# Patient Record
Sex: MALE | Race: BLACK OR AFRICAN AMERICAN | NOT HISPANIC OR LATINO
[De-identification: names, ages, dates, MRNs, and addresses within clinical notes are randomized per-mention and may not be internally consistent; named-entity substitution may affect disease eponyms.]

---

## 2017-07-12 ENCOUNTER — APPOINTMENT (OUTPATIENT)
Dept: UROLOGY | Facility: CLINIC | Age: 67
End: 2017-07-12

## 2017-07-25 ENCOUNTER — APPOINTMENT (OUTPATIENT)
Dept: UROLOGY | Facility: CLINIC | Age: 67
End: 2017-07-25

## 2017-07-29 LAB
ALBUMIN SERPL ELPH-MCNC: 4.7 G/DL
ALP BLD-CCNC: 90 U/L
ALT SERPL-CCNC: 15 U/L
ANION GAP SERPL CALC-SCNC: 15 MMOL/L
APPEARANCE: CLEAR
AST SERPL-CCNC: 20 U/L
BACTERIA UR CULT: NORMAL
BACTERIA: NEGATIVE
BASOPHILS # BLD AUTO: 0.03 K/UL
BASOPHILS NFR BLD AUTO: 0.3 %
BILIRUB SERPL-MCNC: 0.3 MG/DL
BILIRUBIN URINE: NEGATIVE
BLOOD URINE: NEGATIVE
BUN SERPL-MCNC: 14 MG/DL
CALCIUM SERPL-MCNC: 10.3 MG/DL
CHLORIDE SERPL-SCNC: 102 MMOL/L
CO2 SERPL-SCNC: 26 MMOL/L
COLOR: YELLOW
CORE LAB FLUID CYTOLOGY: NORMAL
CREAT SERPL-MCNC: 1.39 MG/DL
EOSINOPHIL # BLD AUTO: 0.22 K/UL
EOSINOPHIL NFR BLD AUTO: 2.1 %
GLUCOSE QUALITATIVE U: NORMAL MG/DL
GLUCOSE SERPL-MCNC: 94 MG/DL
HCT VFR BLD CALC: 44.2 %
HGB BLD-MCNC: 14.7 G/DL
HYALINE CASTS: 2 /LPF
IMM GRANULOCYTES NFR BLD AUTO: 0.3 %
KETONES URINE: NEGATIVE
LEUKOCYTE ESTERASE URINE: NEGATIVE
LYMPHOCYTES # BLD AUTO: 3.11 K/UL
LYMPHOCYTES NFR BLD AUTO: 29.4 %
MAN DIFF?: NORMAL
MCHC RBC-ENTMCNC: 28.2 PG
MCHC RBC-ENTMCNC: 33.3 GM/DL
MCV RBC AUTO: 84.7 FL
MICROSCOPIC-UA: NORMAL
MONOCYTES # BLD AUTO: 1.03 K/UL
MONOCYTES NFR BLD AUTO: 9.7 %
NEUTROPHILS # BLD AUTO: 6.16 K/UL
NEUTROPHILS NFR BLD AUTO: 58.2 %
NITRITE URINE: NEGATIVE
PH URINE: 5.5
PLATELET # BLD AUTO: 340 K/UL
POTASSIUM SERPL-SCNC: 4.9 MMOL/L
PROT SERPL-MCNC: 7.2 G/DL
PROTEIN URINE: 30 MG/DL
PSA SERPL-MCNC: 17.83 NG/ML
RBC # BLD: 5.22 M/UL
RBC # FLD: 16.1 %
RED BLOOD CELLS URINE: 3 /HPF
SODIUM SERPL-SCNC: 143 MMOL/L
SPECIFIC GRAVITY URINE: 1.01
SQUAMOUS EPITHELIAL CELLS: 0 /HPF
TESTOST SERPL-MCNC: 432 NG/DL
UROBILINOGEN URINE: NORMAL MG/DL
WBC # FLD AUTO: 10.58 K/UL
WHITE BLOOD CELLS URINE: 1 /HPF

## 2017-07-31 ENCOUNTER — MESSAGE (OUTPATIENT)
Age: 67
End: 2017-07-31

## 2017-08-08 LAB
BASOPHILS # BLD AUTO: 0.04 K/UL
BASOPHILS NFR BLD AUTO: 0.4 %
EOSINOPHIL # BLD AUTO: 0.21 K/UL
EOSINOPHIL NFR BLD AUTO: 2.1 %
HCT VFR BLD CALC: 44.8 %
HGB BLD-MCNC: 14.9 G/DL
IMM GRANULOCYTES NFR BLD AUTO: 0.4 %
LYMPHOCYTES # BLD AUTO: 3.53 K/UL
LYMPHOCYTES NFR BLD AUTO: 34.7 %
MAN DIFF?: NORMAL
MCHC RBC-ENTMCNC: 28.1 PG
MCHC RBC-ENTMCNC: 33.3 GM/DL
MCV RBC AUTO: 84.4 FL
MONOCYTES # BLD AUTO: 1.03 K/UL
MONOCYTES NFR BLD AUTO: 10.1 %
NEUTROPHILS # BLD AUTO: 5.32 K/UL
NEUTROPHILS NFR BLD AUTO: 52.3 %
PLATELET # BLD AUTO: 362 K/UL
RBC # BLD: 5.31 M/UL
RBC # FLD: 16.3 %
WBC # FLD AUTO: 10.17 K/UL

## 2017-08-09 LAB
ANION GAP SERPL CALC-SCNC: 18 MMOL/L
BUN SERPL-MCNC: 17 MG/DL
CALCIUM SERPL-MCNC: 10.1 MG/DL
CHLORIDE SERPL-SCNC: 103 MMOL/L
CO2 SERPL-SCNC: 23 MMOL/L
CREAT SERPL-MCNC: 1.36 MG/DL
GLUCOSE SERPL-MCNC: 90 MG/DL
POTASSIUM SERPL-SCNC: 5 MMOL/L
SODIUM SERPL-SCNC: 144 MMOL/L

## 2017-10-10 ENCOUNTER — APPOINTMENT (OUTPATIENT)
Dept: UROLOGY | Facility: CLINIC | Age: 67
End: 2017-10-10
Payer: MEDICARE

## 2017-10-10 VITALS
RESPIRATION RATE: 15 BRPM | DIASTOLIC BLOOD PRESSURE: 71 MMHG | WEIGHT: 190 LBS | HEIGHT: 68 IN | SYSTOLIC BLOOD PRESSURE: 134 MMHG | HEART RATE: 73 BPM | BODY MASS INDEX: 28.79 KG/M2

## 2017-10-10 DIAGNOSIS — D72.829 ELEVATED WHITE BLOOD CELL COUNT, UNSPECIFIED: ICD-10-CM

## 2017-10-10 PROCEDURE — 99214 OFFICE O/P EST MOD 30 MIN: CPT

## 2017-10-14 LAB
ALBUMIN SERPL ELPH-MCNC: 4.4 G/DL
ALP BLD-CCNC: 88 U/L
ALT SERPL-CCNC: 22 U/L
ANION GAP SERPL CALC-SCNC: 15 MMOL/L
APPEARANCE: CLEAR
AST SERPL-CCNC: 17 U/L
BACTERIA UR CULT: NORMAL
BACTERIA: NEGATIVE
BASOPHILS # BLD AUTO: 0.03 K/UL
BASOPHILS NFR BLD AUTO: 0.3 %
BILIRUB SERPL-MCNC: 0.3 MG/DL
BILIRUBIN URINE: NEGATIVE
BLOOD URINE: NEGATIVE
BUN SERPL-MCNC: 18 MG/DL
CALCIUM SERPL-MCNC: 10.1 MG/DL
CHLORIDE SERPL-SCNC: 106 MMOL/L
CO2 SERPL-SCNC: 25 MMOL/L
COLOR: YELLOW
CREAT SERPL-MCNC: 1.41 MG/DL
EOSINOPHIL # BLD AUTO: 0.19 K/UL
EOSINOPHIL NFR BLD AUTO: 2 %
GLUCOSE QUALITATIVE U: NEGATIVE MG/DL
GLUCOSE SERPL-MCNC: 108 MG/DL
HCT VFR BLD CALC: 43.6 %
HGB BLD-MCNC: 14.6 G/DL
HYALINE CASTS: 4 /LPF
IMM GRANULOCYTES NFR BLD AUTO: 0.3 %
KETONES URINE: NEGATIVE
LEUKOCYTE ESTERASE URINE: NEGATIVE
LYMPHOCYTES # BLD AUTO: 2.81 K/UL
LYMPHOCYTES NFR BLD AUTO: 30.2 %
MAN DIFF?: NORMAL
MCHC RBC-ENTMCNC: 28.7 PG
MCHC RBC-ENTMCNC: 33.5 GM/DL
MCV RBC AUTO: 85.7 FL
MICROSCOPIC-UA: NORMAL
MONOCYTES # BLD AUTO: 0.85 K/UL
MONOCYTES NFR BLD AUTO: 9.1 %
NEUTROPHILS # BLD AUTO: 5.4 K/UL
NEUTROPHILS NFR BLD AUTO: 58.1 %
NITRITE URINE: NEGATIVE
PH URINE: 5.5
PLATELET # BLD AUTO: 349 K/UL
POTASSIUM SERPL-SCNC: 5 MMOL/L
PROT SERPL-MCNC: 7.3 G/DL
PROTEIN URINE: 30 MG/DL
PSA SERPL-MCNC: 17.1 NG/ML
RBC # BLD: 5.09 M/UL
RBC # FLD: 15.9 %
RED BLOOD CELLS URINE: 4 /HPF
SODIUM SERPL-SCNC: 146 MMOL/L
SPECIFIC GRAVITY URINE: 1.02
SQUAMOUS EPITHELIAL CELLS: 1 /HPF
TESTOST SERPL-MCNC: 470.8 NG/DL
UROBILINOGEN URINE: NEGATIVE MG/DL
WBC # FLD AUTO: 9.31 K/UL
WHITE BLOOD CELLS URINE: 3 /HPF

## 2017-10-16 LAB — CORE LAB FLUID CYTOLOGY: NORMAL

## 2017-11-14 ENCOUNTER — APPOINTMENT (OUTPATIENT)
Dept: UROLOGY | Facility: CLINIC | Age: 67
End: 2017-11-14
Payer: MEDICARE

## 2017-11-14 PROCEDURE — 99213 OFFICE O/P EST LOW 20 MIN: CPT

## 2018-04-17 ENCOUNTER — APPOINTMENT (OUTPATIENT)
Dept: UROLOGY | Facility: CLINIC | Age: 68
End: 2018-04-17
Payer: MEDICARE

## 2018-04-17 PROCEDURE — 99214 OFFICE O/P EST MOD 30 MIN: CPT

## 2018-04-22 LAB
ALBUMIN SERPL ELPH-MCNC: 4.4 G/DL
ALP BLD-CCNC: 82 U/L
ALT SERPL-CCNC: 13 U/L
ANION GAP SERPL CALC-SCNC: 15 MMOL/L
APPEARANCE: CLEAR
AST SERPL-CCNC: 19 U/L
BACTERIA UR CULT: NORMAL
BACTERIA: NEGATIVE
BASOPHILS # BLD AUTO: 0.03 K/UL
BASOPHILS NFR BLD AUTO: 0.4 %
BILIRUB SERPL-MCNC: 0.5 MG/DL
BILIRUBIN URINE: NEGATIVE
BLOOD URINE: NEGATIVE
BUN SERPL-MCNC: 14 MG/DL
CALCIUM SERPL-MCNC: 10.6 MG/DL
CHLORIDE SERPL-SCNC: 105 MMOL/L
CO2 SERPL-SCNC: 25 MMOL/L
COLOR: YELLOW
CORE LAB FLUID CYTOLOGY: NORMAL
CREAT SERPL-MCNC: 1.33 MG/DL
EOSINOPHIL # BLD AUTO: 0.12 K/UL
EOSINOPHIL NFR BLD AUTO: 1.4 %
GLUCOSE QUALITATIVE U: NEGATIVE MG/DL
GLUCOSE SERPL-MCNC: 92 MG/DL
HCT VFR BLD CALC: 44.5 %
HGB BLD-MCNC: 15.3 G/DL
HYALINE CASTS: 1 /LPF
IMM GRANULOCYTES NFR BLD AUTO: 0.1 %
KETONES URINE: NEGATIVE
LEUKOCYTE ESTERASE URINE: NEGATIVE
LYMPHOCYTES # BLD AUTO: 2.3 K/UL
LYMPHOCYTES NFR BLD AUTO: 26.8 %
MAN DIFF?: NORMAL
MCHC RBC-ENTMCNC: 29.5 PG
MCHC RBC-ENTMCNC: 34.4 GM/DL
MCV RBC AUTO: 85.9 FL
MICROSCOPIC-UA: NORMAL
MONOCYTES # BLD AUTO: 0.94 K/UL
MONOCYTES NFR BLD AUTO: 11 %
NEUTROPHILS # BLD AUTO: 5.17 K/UL
NEUTROPHILS NFR BLD AUTO: 60.3 %
NITRITE URINE: NEGATIVE
PH URINE: 5.5
PLATELET # BLD AUTO: 360 K/UL
POTASSIUM SERPL-SCNC: 5 MMOL/L
PROT SERPL-MCNC: 7.4 G/DL
PROTEIN URINE: 30 MG/DL
PSA SERPL-MCNC: 18.79 NG/ML
RBC # BLD: 5.18 M/UL
RBC # FLD: 15.8 %
RED BLOOD CELLS URINE: 1 /HPF
SODIUM SERPL-SCNC: 145 MMOL/L
SPECIFIC GRAVITY URINE: 1.01
SQUAMOUS EPITHELIAL CELLS: 0 /HPF
TESTOST SERPL-MCNC: 452.9 NG/DL
UROBILINOGEN URINE: NEGATIVE MG/DL
WBC # FLD AUTO: 8.57 K/UL
WHITE BLOOD CELLS URINE: 0 /HPF

## 2018-09-17 ENCOUNTER — APPOINTMENT (OUTPATIENT)
Dept: UROLOGY | Facility: CLINIC | Age: 68
End: 2018-09-17
Payer: MEDICARE

## 2018-09-17 ENCOUNTER — LABORATORY RESULT (OUTPATIENT)
Age: 68
End: 2018-09-17

## 2018-09-17 VITALS
HEART RATE: 69 BPM | RESPIRATION RATE: 16 BRPM | TEMPERATURE: 98.5 F | DIASTOLIC BLOOD PRESSURE: 75 MMHG | SYSTOLIC BLOOD PRESSURE: 165 MMHG

## 2018-09-17 LAB
ALBUMIN SERPL ELPH-MCNC: 4.7 G/DL
ALP BLD-CCNC: 90 U/L
ALT SERPL-CCNC: 17 U/L
ANION GAP SERPL CALC-SCNC: 13 MMOL/L
APPEARANCE: CLEAR
AST SERPL-CCNC: 17 U/L
BACTERIA: NEGATIVE
BASOPHILS # BLD AUTO: 0.04 K/UL
BASOPHILS NFR BLD AUTO: 0.4 %
BILIRUB SERPL-MCNC: 0.2 MG/DL
BILIRUBIN URINE: NEGATIVE
BLOOD URINE: NEGATIVE
BUN SERPL-MCNC: 12 MG/DL
CALCIUM SERPL-MCNC: 10.4 MG/DL
CHLORIDE SERPL-SCNC: 102 MMOL/L
CO2 SERPL-SCNC: 27 MMOL/L
COLOR: YELLOW
CREAT SERPL-MCNC: 1.28 MG/DL
EOSINOPHIL # BLD AUTO: 0.14 K/UL
EOSINOPHIL NFR BLD AUTO: 1.5 %
GLUCOSE QUALITATIVE U: NEGATIVE MG/DL
GLUCOSE SERPL-MCNC: 102 MG/DL
HCT VFR BLD CALC: 44.3 %
HGB BLD-MCNC: 14.7 G/DL
HYALINE CASTS: 1 /LPF
IMM GRANULOCYTES NFR BLD AUTO: 0.4 %
KETONES URINE: NEGATIVE
LEUKOCYTE ESTERASE URINE: NEGATIVE
LYMPHOCYTES # BLD AUTO: 2.15 K/UL
LYMPHOCYTES NFR BLD AUTO: 22.3 %
MAN DIFF?: NORMAL
MCHC RBC-ENTMCNC: 29 PG
MCHC RBC-ENTMCNC: 33.2 GM/DL
MCV RBC AUTO: 87.4 FL
MICROSCOPIC-UA: NORMAL
MONOCYTES # BLD AUTO: 1.58 K/UL
MONOCYTES NFR BLD AUTO: 16.4 %
NEUTROPHILS # BLD AUTO: 5.67 K/UL
NEUTROPHILS NFR BLD AUTO: 59 %
NITRITE URINE: NEGATIVE
PH URINE: 6
PLATELET # BLD AUTO: 382 K/UL
POTASSIUM SERPL-SCNC: 5.1 MMOL/L
PROT SERPL-MCNC: 6.9 G/DL
PROTEIN URINE: 30 MG/DL
PSA SERPL-MCNC: 17.55 NG/ML
RBC # BLD: 5.07 M/UL
RBC # FLD: 15.8 %
RED BLOOD CELLS URINE: 2 /HPF
SODIUM SERPL-SCNC: 142 MMOL/L
SPECIFIC GRAVITY URINE: 1.01
SQUAMOUS EPITHELIAL CELLS: 0 /HPF
TESTOST SERPL-MCNC: 388 NG/DL
UROBILINOGEN URINE: NEGATIVE MG/DL
WBC # FLD AUTO: 9.62 K/UL
WHITE BLOOD CELLS URINE: 1 /HPF

## 2018-09-17 PROCEDURE — 99214 OFFICE O/P EST MOD 30 MIN: CPT

## 2018-09-20 LAB
BACTERIA UR CULT: NORMAL
CORE LAB FLUID CYTOLOGY: NORMAL

## 2018-09-30 ENCOUNTER — FORM ENCOUNTER (OUTPATIENT)
Age: 68
End: 2018-09-30

## 2018-10-01 ENCOUNTER — OUTPATIENT (OUTPATIENT)
Dept: OUTPATIENT SERVICES | Facility: HOSPITAL | Age: 68
LOS: 1 days | End: 2018-10-01
Payer: COMMERCIAL

## 2018-10-01 ENCOUNTER — APPOINTMENT (OUTPATIENT)
Dept: NUCLEAR MEDICINE | Facility: IMAGING CENTER | Age: 68
End: 2018-10-01
Payer: MEDICARE

## 2018-10-01 ENCOUNTER — APPOINTMENT (OUTPATIENT)
Dept: CT IMAGING | Facility: IMAGING CENTER | Age: 68
End: 2018-10-01
Payer: MEDICARE

## 2018-10-01 DIAGNOSIS — R31.29 OTHER MICROSCOPIC HEMATURIA: ICD-10-CM

## 2018-10-01 DIAGNOSIS — C61 MALIGNANT NEOPLASM OF PROSTATE: ICD-10-CM

## 2018-10-01 PROCEDURE — 78306 BONE IMAGING WHOLE BODY: CPT

## 2018-10-01 PROCEDURE — 78306 BONE IMAGING WHOLE BODY: CPT | Mod: 26

## 2018-10-01 PROCEDURE — 71260 CT THORAX DX C+: CPT | Mod: 26

## 2018-10-01 PROCEDURE — 74178 CT ABD&PLV WO CNTR FLWD CNTR: CPT

## 2018-10-01 PROCEDURE — A9561: CPT

## 2018-10-01 PROCEDURE — 71260 CT THORAX DX C+: CPT

## 2018-10-01 PROCEDURE — 74178 CT ABD&PLV WO CNTR FLWD CNTR: CPT | Mod: 26

## 2018-10-23 ENCOUNTER — APPOINTMENT (OUTPATIENT)
Dept: THORACIC SURGERY | Facility: CLINIC | Age: 68
End: 2018-10-23
Payer: MEDICARE

## 2018-10-23 VITALS
HEIGHT: 68 IN | WEIGHT: 199 LBS | OXYGEN SATURATION: 100 % | TEMPERATURE: 98.3 F | HEART RATE: 72 BPM | RESPIRATION RATE: 18 BRPM | SYSTOLIC BLOOD PRESSURE: 141 MMHG | DIASTOLIC BLOOD PRESSURE: 71 MMHG | BODY MASS INDEX: 30.16 KG/M2

## 2018-10-23 PROCEDURE — 99205 OFFICE O/P NEW HI 60 MIN: CPT

## 2019-01-16 ENCOUNTER — APPOINTMENT (OUTPATIENT)
Dept: INTERVENTIONAL RADIOLOGY/VASCULAR | Facility: CLINIC | Age: 69
End: 2019-01-16
Payer: MEDICARE

## 2019-01-16 VITALS
HEART RATE: 69 BPM | BODY MASS INDEX: 30.31 KG/M2 | OXYGEN SATURATION: 97 % | HEIGHT: 68 IN | SYSTOLIC BLOOD PRESSURE: 128 MMHG | RESPIRATION RATE: 16 BRPM | WEIGHT: 200 LBS | DIASTOLIC BLOOD PRESSURE: 75 MMHG

## 2019-01-16 DIAGNOSIS — Z86.79 PERSONAL HISTORY OF OTHER DISEASES OF THE CIRCULATORY SYSTEM: ICD-10-CM

## 2019-01-16 DIAGNOSIS — R91.1 SOLITARY PULMONARY NODULE: ICD-10-CM

## 2019-01-16 DIAGNOSIS — Z78.9 OTHER SPECIFIED HEALTH STATUS: ICD-10-CM

## 2019-01-16 PROCEDURE — 99204 OFFICE O/P NEW MOD 45 MIN: CPT

## 2019-01-16 NOTE — HISTORY OF PRESENT ILLNESS
[FreeTextEntry1] : 68 years old male with hx of prostate cancer s/p surgery and seed implant in 2006. Patient has been following up with Dr. Epperson and had a surveillance CT C/A/P and bone scan. \par Ct scan demonstrated, “ No hydronephrosis, renal or collecting system calculi. No filling defects in the collecting systems or urinary bladder on urographic phase images. 2 mm right middle lobe pulmonary nodule. Right axillary lymph noted is slightly increased in size compared with the prior study”. \par Patient was referred to Dr. Ag for the pulmonary nodules. \par Dr. Ag referred patient to IR for axillary lymph node biopsy and to have six  month f/u chest CT for pulmonary nodules. \par \par Patients states he has been doing well overall. Appetite has been good no unintentional weight loss. \par \par Denies any recent SOB, CP, fever, chills, n/v/d, gross hematuria. \par

## 2019-01-16 NOTE — ASSESSMENT
[FreeTextEntry1] : 68 year old male with h/o prostate cancer found to have slowly increasing in size right axillary lymph node, 2.6 x1.1 cm on CT form 10/01/18, 1.8 x 0.6 cm on  CT is from 12/2015. Patient is an appropriate candidate for US guided right axillary l/n needle biopsy under local anesthesia. The procedure, the risks of bleeding, infection, tumor seeding were discussed with the patient and informed consent obtained.

## 2019-01-16 NOTE — PHYSICAL EXAM
[Alert] : alert [Normal Sclera/Conjunctiva] : normal sclera/conjunctiva [Normal Hearing] : hearing was normal [No Respiratory Distress] : no respiratory distress [Normal Rate] : heart rate was normal  [Normal Gait] : normal gait [No Tremors] : no tremors [Oriented x3] : oriented to person, place, and time [No Accessory Muscle Use] : no accessory muscle use [Clear to Auscultation] : lungs were clear to auscultation bilaterally

## 2019-01-16 NOTE — REVIEW OF SYSTEMS
[Fever] : no fever [Chills] : no chills [Loss Of Hearing] : no hearing loss [Shortness Of Breath] : no shortness of breath [SOB on Exertion] : no shortness of breath during exertion [Easy Bleeding] : no tendency for easy bleeding [Easy Bruising] : no tendency for easy bruising

## 2019-01-16 NOTE — CONSULT LETTER
[Dear  ___] : Dear  [unfilled], [Consult Letter:] : I had the pleasure of evaluating your patient, [unfilled]. [Please see my note below.] : Please see my note below. [Consult Closing:] : Thank you very much for allowing me to participate in the care of this patient.  If you have any questions, please do not hesitate to contact me. [Sincerely,] : Sincerely, [FreeTextEntry2] : Dr. Jose Ag

## 2019-01-28 ENCOUNTER — RESULT REVIEW (OUTPATIENT)
Age: 69
End: 2019-01-28

## 2019-01-28 ENCOUNTER — OUTPATIENT (OUTPATIENT)
Dept: OUTPATIENT SERVICES | Facility: HOSPITAL | Age: 69
LOS: 1 days | End: 2019-01-28
Payer: COMMERCIAL

## 2019-01-28 DIAGNOSIS — C61 MALIGNANT NEOPLASM OF PROSTATE: ICD-10-CM

## 2019-01-28 DIAGNOSIS — R59.1 GENERALIZED ENLARGED LYMPH NODES: ICD-10-CM

## 2019-01-28 PROCEDURE — 88173 CYTOPATH EVAL FNA REPORT: CPT | Mod: 26

## 2019-01-28 PROCEDURE — 38505 NEEDLE BIOPSY LYMPH NODES: CPT

## 2019-01-28 PROCEDURE — 76942 ECHO GUIDE FOR BIOPSY: CPT | Mod: 26

## 2019-01-29 LAB
NON-GYNECOLOGICAL CYTOLOGY STUDY: SIGNIFICANT CHANGE UP
TM INTERPRETATION: SIGNIFICANT CHANGE UP

## 2019-01-30 DIAGNOSIS — R59.0 LOCALIZED ENLARGED LYMPH NODES: ICD-10-CM

## 2019-01-30 DIAGNOSIS — Z85.46 PERSONAL HISTORY OF MALIGNANT NEOPLASM OF PROSTATE: ICD-10-CM

## 2019-04-04 ENCOUNTER — OTHER (OUTPATIENT)
Age: 69
End: 2019-04-04

## 2019-04-09 ENCOUNTER — OTHER (OUTPATIENT)
Age: 69
End: 2019-04-09

## 2019-04-09 ENCOUNTER — CHART COPY (OUTPATIENT)
Age: 69
End: 2019-04-09

## 2019-04-22 ENCOUNTER — OUTPATIENT (OUTPATIENT)
Dept: OUTPATIENT SERVICES | Facility: HOSPITAL | Age: 69
LOS: 1 days | End: 2019-04-22
Payer: COMMERCIAL

## 2019-04-22 VITALS
HEART RATE: 68 BPM | HEIGHT: 66 IN | SYSTOLIC BLOOD PRESSURE: 130 MMHG | RESPIRATION RATE: 14 BRPM | TEMPERATURE: 99 F | WEIGHT: 199.96 LBS | OXYGEN SATURATION: 98 % | DIASTOLIC BLOOD PRESSURE: 70 MMHG

## 2019-04-22 DIAGNOSIS — L72.0 EPIDERMAL CYST: ICD-10-CM

## 2019-04-22 DIAGNOSIS — Z98.49 CATARACT EXTRACTION STATUS, UNSPECIFIED EYE: Chronic | ICD-10-CM

## 2019-04-22 DIAGNOSIS — Z98.890 OTHER SPECIFIED POSTPROCEDURAL STATES: Chronic | ICD-10-CM

## 2019-04-22 DIAGNOSIS — R06.83 SNORING: ICD-10-CM

## 2019-04-22 DIAGNOSIS — I10 ESSENTIAL (PRIMARY) HYPERTENSION: ICD-10-CM

## 2019-04-22 LAB
ALBUMIN SERPL ELPH-MCNC: 4.5 G/DL — SIGNIFICANT CHANGE UP (ref 3.3–5)
ALP SERPL-CCNC: 74 U/L — SIGNIFICANT CHANGE UP (ref 40–120)
ALT FLD-CCNC: 11 U/L — SIGNIFICANT CHANGE UP (ref 4–41)
ANION GAP SERPL CALC-SCNC: 11 MMO/L — SIGNIFICANT CHANGE UP (ref 7–14)
AST SERPL-CCNC: 17 U/L — SIGNIFICANT CHANGE UP (ref 4–40)
BILIRUB SERPL-MCNC: 0.3 MG/DL — SIGNIFICANT CHANGE UP (ref 0.2–1.2)
BUN SERPL-MCNC: 18 MG/DL — SIGNIFICANT CHANGE UP (ref 7–23)
CALCIUM SERPL-MCNC: 10.4 MG/DL — SIGNIFICANT CHANGE UP (ref 8.4–10.5)
CHLORIDE SERPL-SCNC: 103 MMOL/L — SIGNIFICANT CHANGE UP (ref 98–107)
CO2 SERPL-SCNC: 28 MMOL/L — SIGNIFICANT CHANGE UP (ref 22–31)
CREAT SERPL-MCNC: 1.29 MG/DL — SIGNIFICANT CHANGE UP (ref 0.5–1.3)
GLUCOSE SERPL-MCNC: 83 MG/DL — SIGNIFICANT CHANGE UP (ref 70–99)
HCT VFR BLD CALC: 45.5 % — SIGNIFICANT CHANGE UP (ref 39–50)
HGB BLD-MCNC: 14.8 G/DL — SIGNIFICANT CHANGE UP (ref 13–17)
MCHC RBC-ENTMCNC: 28.6 PG — SIGNIFICANT CHANGE UP (ref 27–34)
MCHC RBC-ENTMCNC: 32.5 % — SIGNIFICANT CHANGE UP (ref 32–36)
MCV RBC AUTO: 87.8 FL — SIGNIFICANT CHANGE UP (ref 80–100)
NRBC # FLD: 0 K/UL — SIGNIFICANT CHANGE UP (ref 0–0)
PLATELET # BLD AUTO: 352 K/UL — SIGNIFICANT CHANGE UP (ref 150–400)
PMV BLD: 10.1 FL — SIGNIFICANT CHANGE UP (ref 7–13)
POTASSIUM SERPL-MCNC: 5 MMOL/L — SIGNIFICANT CHANGE UP (ref 3.5–5.3)
POTASSIUM SERPL-SCNC: 5 MMOL/L — SIGNIFICANT CHANGE UP (ref 3.5–5.3)
PROT SERPL-MCNC: 7.3 G/DL — SIGNIFICANT CHANGE UP (ref 6–8.3)
RBC # BLD: 5.18 M/UL — SIGNIFICANT CHANGE UP (ref 4.2–5.8)
RBC # FLD: 14.9 % — HIGH (ref 10.3–14.5)
SODIUM SERPL-SCNC: 142 MMOL/L — SIGNIFICANT CHANGE UP (ref 135–145)
WBC # BLD: 8.61 K/UL — SIGNIFICANT CHANGE UP (ref 3.8–10.5)
WBC # FLD AUTO: 8.61 K/UL — SIGNIFICANT CHANGE UP (ref 3.8–10.5)

## 2019-04-22 PROCEDURE — 93010 ELECTROCARDIOGRAM REPORT: CPT

## 2019-04-22 NOTE — H&P PST ADULT - ASSESSMENT
Patient is scheduled for excision left ear cyst scheduled on 4/30/2019 with Dr. Quiroz. Pre op diagnosis: Epidermal cyst

## 2019-04-22 NOTE — H&P PST ADULT - NSANTHOSAYNRD_GEN_A_CORE
No. PEPE screening performed.  STOP BANG Legend: 0-2 = LOW Risk; 3-4 = INTERMEDIATE Risk; 5-8 = HIGH Risk

## 2019-04-22 NOTE — H&P PST ADULT - HISTORY OF PRESENT ILLNESS
Patient is a 68 year old male presents to Presurgical testing for an evaluation for scheduled excision left ear cyst scheduled on 4/30/2019 with Dr. Quiroz. Pre op diagnosis: Epidermal cyst. Patient reports having the cyst for the past year and half. Patient denies any pain, drainage to the area.

## 2019-04-22 NOTE — H&P PST ADULT - NEGATIVE ENMT SYMPTOMS
no ear pain/no throat pain/no post-nasal discharge/no nose bleeds/no recurrent cold sores/no dry mouth/no dysphagia/no tinnitus/no sinus symptoms/no nasal discharge/no gum bleeding/no nasal congestion/no nasal obstruction/no abnormal taste sensation/no vertigo

## 2019-04-22 NOTE — H&P PST ADULT - NSICDXPASTSURGICALHX_GEN_ALL_CORE_FT
PAST SURGICAL HISTORY:  History of surgery Cryosurgical ablation of prostate with flexible cystoscopy on 02/03/2012    Prostate seed implant 2006     S/P cataract surgery Right eye

## 2019-04-22 NOTE — H&P PST ADULT - NEGATIVE MUSCULOSKELETAL SYMPTOMS
no myalgia/no arm pain L/no back pain/no joint swelling/no leg pain L/no stiffness/no neck pain/no arm pain R/no leg pain R/no arthritis/no muscle cramps/no muscle weakness

## 2019-04-22 NOTE — H&P PST ADULT - NEGATIVE NEUROLOGICAL SYMPTOMS
no weakness/no tremors/no paresthesias/no generalized seizures/no loss of consciousness/no focal seizures/no vertigo/no loss of sensation/no difficulty walking/no headache/no confusion

## 2019-04-22 NOTE — H&P PST ADULT - NEGATIVE GASTROINTESTINAL SYMPTOMS
no nausea/no vomiting/no diarrhea/no melena/no constipation/no abdominal pain/no change in bowel habits

## 2019-04-22 NOTE — H&P PST ADULT - MUSCULOSKELETAL
details… detailed exam no joint warmth/no calf tenderness/no joint swelling/no joint erythema/ROM intact/normal strength

## 2019-04-22 NOTE — H&P PST ADULT - NEGATIVE OPHTHALMOLOGIC SYMPTOMS
no irritation L/no blurred vision L/no discharge L/no photophobia/no blurred vision R/no discharge R/no pain L/no irritation R/no diplopia/no pain R

## 2019-04-22 NOTE — H&P PST ADULT - NSICDXPROBLEM_GEN_ALL_CORE_FT
PROBLEM DIAGNOSES  Problem: Epidermal cyst  Assessment and Plan: Patient is scheduled for excision left ear cyst scheduled on 4/30/2019 with Dr. Quiroz.  Preop instructions, pepcid provided. Pt stated understanding.  Pending medical evaluation per surgeon and PST- Patient with a history of Stroke in 2011, Prostate cancer - patient reports has an appointment on 4/22/2019.     Problem: Snoring  Assessment and Plan: PEPE precautions, OR booking notified.      Problem: Hypertension  Assessment and Plan: Patient instructed to take Amlodipine in the AM of surgery with a sip of water.

## 2019-04-22 NOTE — H&P PST ADULT - NSICDXPASTMEDICALHX_GEN_ALL_CORE_FT
PAST MEDICAL HISTORY:  Cataract     Epidermal cyst     Erectile dysfunction     Fall against object 2009     HLD (hyperlipidemia)     HTN (hypertension)     Malignant neoplasm of prostate     Stroke Nov 2011 Denies residual , No neurologist

## 2019-04-22 NOTE — H&P PST ADULT - SKIN COMMENTS
Pre op diagnosis: Epidermal cyst, --left ear cyst noted, non tender to touch , 1cm x 1cm approximately

## 2019-04-22 NOTE — H&P PST ADULT - NEGATIVE GENERAL GENITOURINARY SYMPTOMS
normal urinary frequency/no flank pain R/no urine discoloration/no incontinence/no bladder infections/no flank pain L/no hematuria/no urinary hesitancy/no dysuria

## 2019-04-22 NOTE — H&P PST ADULT - RS GEN PE MLT RESP DETAILS PC
respirations non-labored/no rales/clear to auscultation bilaterally/airway patent/breath sounds equal/no wheezes/good air movement/no rhonchi

## 2019-04-22 NOTE — H&P PST ADULT - NEGATIVE ALLERGY TYPES
no reactions to medicines/no outdoor environmental allergies/no reactions to animals/no reactions to insect bites/no reactions to food/no indoor environmental allergies

## 2019-04-23 ENCOUNTER — APPOINTMENT (OUTPATIENT)
Dept: THORACIC SURGERY | Facility: CLINIC | Age: 69
End: 2019-04-23
Payer: MEDICARE

## 2019-04-23 VITALS
HEIGHT: 60 IN | TEMPERATURE: 98.6 F | HEART RATE: 70 BPM | BODY MASS INDEX: 39.27 KG/M2 | OXYGEN SATURATION: 100 % | RESPIRATION RATE: 16 BRPM | DIASTOLIC BLOOD PRESSURE: 72 MMHG | SYSTOLIC BLOOD PRESSURE: 127 MMHG | WEIGHT: 200 LBS

## 2019-04-23 DIAGNOSIS — R59.0 LOCALIZED ENLARGED LYMPH NODES: ICD-10-CM

## 2019-04-23 PROCEDURE — 99214 OFFICE O/P EST MOD 30 MIN: CPT

## 2019-04-25 ENCOUNTER — APPOINTMENT (OUTPATIENT)
Dept: UROLOGY | Facility: CLINIC | Age: 69
End: 2019-04-25
Payer: MEDICARE

## 2019-04-25 PROCEDURE — 99213 OFFICE O/P EST LOW 20 MIN: CPT

## 2019-04-25 NOTE — DISCUSSION/SUMMARY
[FreeTextEntry1] : the patient has returned for followup. He has been injecting 0.4 cc to trimix. He is also noticed that the needle side bending with injection. We discussed using a needle of 29-gauge instead of 30-gauge which we have requested. We also renewed his prescription. With one refill. I'll see him back in 3 months in followup.

## 2019-04-25 NOTE — ASSESSMENT
[FreeTextEntry1] : the patient has returned for followup. He has been injecting 0.4 cc of the trimix. He is using the 29 ga needle with better results. He needs a refill today.I reviewed with him the procedure of injection therapy. He is also thinking about the penile implant but not quite ready yet.

## 2019-04-25 NOTE — HISTORY OF PRESENT ILLNESS
[FreeTextEntry1] : the patient has returned for followup. He has been injecting 0.4 cc of the trimix. He is using the 29 ga needle with better results. He needs a refill. Not ready for IPP yet. \par \par PMH: Patient initially presented  with a chief complaint of erectile dysfunction.  Patient had cryosurgery of the prostate in February of this year. Prior to surgery had decrease in his erections are responding to Viagra at 100 mg and use. However, after surgery he said a decrease in his ability to obtain or maintain erections even with these medications. His erections are not modified with the degree of sexual stimulation.   He states that his erections presently are often less than 2 out of 10 in both tumescence and rigidity, insufficient for penetration.   He often ejaculates through a flaccid phallus.  He has difficulty maintaining an erection. He describes a normal libido.  His sexual dysfunction occurs with both sexual relations and masturbation.  His erections are not improved with PDE5 inhibitors.   His partner is understanding and was unable to be with him at the visit today. He is   and has adult children.  \par \par His past medical history is right testicular discomfort and a nodule on his right testis he is noted for several months.  In his present occupation he has no known toxin exposure. He does not smoke and drinks socially .  He has no known drug allergies. His review of systems and past family and social history is otherwise noncontributory (see patient completed questionnaire). His family history is not significant. He has an AUA symptom score of 21. He has a sexual function symptom score of 20 (normal is 60-75).  \par patient has been injecting 0.35 cc of Trimix, he feels that the erection are strong  and he is satisfied.  He was interested in have retraining on how to inject, as he sometimes gets inadequate response to the injections.  He states that he often does not wear his glasses while injecting.  Patients PSA also increased this month to >9 from 6.5 and trace blood on urinalysis.  Dr Epperson is following and has ordered a bone scan and CT urogram.

## 2019-04-25 NOTE — PHYSICAL EXAM
[General Appearance - Well Developed] : well developed [Normal Appearance] : normal appearance [General Appearance - Well Nourished] : well nourished [General Appearance - In No Acute Distress] : no acute distress [Well Groomed] : well groomed [Bowel Sounds] : normal bowel sounds [Abdomen Soft] : soft [Costovertebral Angle Tenderness] : no ~M costovertebral angle tenderness [Abdomen Tenderness] : non-tender [Urethral Meatus] : meatus normal [Urinary Bladder Findings] : the bladder was normal on palpation [Scrotum] : the scrotum was normal [Testes Mass (___cm)] : there were no testicular masses [No Prostate Nodules] : no prostate nodules [Edema] : no peripheral edema [] : no respiratory distress [Exaggerated Use Of Accessory Muscles For Inspiration] : no accessory muscle use [Respiration, Rhythm And Depth] : normal respiratory rhythm and effort [Oriented To Time, Place, And Person] : oriented to person, place, and time [Mood] : the mood was normal [Affect] : the affect was normal [Not Anxious] : not anxious [Normal Station and Gait] : the gait and station were normal for the patient's age [No Focal Deficits] : no focal deficits [No Palpable Adenopathy] : no palpable adenopathy [FreeTextEntry1] : no plaques induration or tenderness along the penile shaft

## 2019-04-26 NOTE — PHYSICAL EXAM
[Sclera] : the sclera and conjunctiva were normal [PERRL With Normal Accommodation] : pupils were equal in size, round, and reactive to light [Neck Appearance] : the appearance of the neck was normal [Heart Rate And Rhythm] : heart rate was normal and rhythm regular [Auscultation Breath Sounds / Voice Sounds] : lungs were clear to auscultation bilaterally [Respiration, Rhythm And Depth] : normal respiratory rhythm and effort [Heart Sounds] : normal S1 and S2 [Examination Of The Chest] : the chest was normal in appearance [2+] : left 2+ [Breast Appearance] : normal in appearance [Abdomen Soft] : soft [Bowel Sounds] : normal bowel sounds [Abdomen Tenderness] : non-tender [Cervical Lymph Nodes Enlarged Posterior Bilaterally] : posterior cervical [Cervical Lymph Nodes Enlarged Anterior Bilaterally] : anterior cervical [Supraclavicular Lymph Nodes Enlarged Bilaterally] : supraclavicular [No CVA Tenderness] : no ~M costovertebral angle tenderness [Abnormal Walk] : normal gait [Nail Clubbing] : no clubbing  or cyanosis of the fingernails [Musculoskeletal - Swelling] : no joint swelling seen [Motor Tone] : muscle strength and tone were normal [Skin Turgor] : normal skin turgor [Skin Color & Pigmentation] : normal skin color and pigmentation [] : no rash [Deep Tendon Reflexes (DTR)] : deep tendon reflexes were 2+ and symmetric [Sensation] : the sensory exam was normal to light touch and pinprick [No Focal Deficits] : no focal deficits [Oriented To Time, Place, And Person] : oriented to person, place, and time [Impaired Insight] : insight and judgment were intact [Affect] : the affect was normal [FreeTextEntry1] : unable to palpate right axillary lymph node

## 2019-04-26 NOTE — DATA REVIEWED
[FreeTextEntry1] : Pt is s/p right axillary lymph node FNA on 1/28/19, pathology was negative for malignant cells.

## 2019-04-26 NOTE — CONSULT LETTER
[Courtesy Letter:] : I had the pleasure of seeing your patient, [unfilled], in my office today. [Dear  ___] : Dear  [unfilled], [Please see my note below.] : Please see my note below. [Sincerely,] : Sincerely, [DrChris  ___] : Dr. BASILIO [FreeTextEntry3] : Felix Ag MD, FACS \par Chief, Division of Thoracic Surgery \par Director, Minimally Invasive Thoracic Surgery \par Department of Cardiovascular and Thoracic Surgery \par [facility] \par , Cardiovascular and Thoracic Surgery \par [employer] [facility]  [FreeTextEntry2] : Dr. Prasanth Deluca (PCP/Ref)\par Dr. Epperson (Urology)

## 2019-04-26 NOTE — HISTORY OF PRESENT ILLNESS
[FreeTextEntry1] : 68 year old male with history of prostate cancer s/p seed implants and surgery, CVA 2011, HTN.  Surveillance CT ordered by urology revealed abnormality.\par \par CT scan chest/abd/pelvis 10/1/18:\par -Calcified granulomas again visualized in the right middle lobe. \par -There is a 2 mm right middle lobe pulmonary nodule newly seen (on series 606, image 46). \par - A right axillary lymph node is increased measuring 2.6 x 1.1 cm on the current exam (series 4, image 19), previously measuring 1.8 x 0.6 cm. Small calcified mediastinal lymph nodes are unchanged. \par \par Pt is s/p right axillary lymph node FNA on 1/28/19, pathology was negative for malignant cells. \par \par Pt is here today for follow up and reports he is feeling well, denies chest pain, shortness of breath, fever, cough, palpitations, or unintentional weight loss. Pt was instructed to obtain CT Chest prior to this visit, however it was not done.

## 2019-04-26 NOTE — ASSESSMENT
[FreeTextEntry1] : 68 year old male with history of prostate cancer s/p seed implants and surgery, CVA 2011, HTN.  Surveillance CT ordered by urology revealed abnormality.\par \par CT scan chest/abd/pelvis 10/1/18:\par -Calcified granulomas again visualized in the right middle lobe. \par -There is a 2 mm right middle lobe pulmonary nodule newly seen (on series 606, image 46). \par - A right axillary lymph node is increased measuring 2.6 x 1.1 cm on the current exam (series 4, image 19), previously measuring 1.8 x 0.6 cm. Small calcified mediastinal lymph nodes are unchanged. \par \par Pt is s/p right axillary lymph node FNA on 1/28/19, pathology was negative for malignant cells. \par \par I have reviewed the patient's medical records and diagnostic images at time of this office consultation and have recommended Mr. Tatum to obtain CT Chest at his earliest convenience and will call him with results and plan.\par \par Written by Wing Jackie NP, acting as a scribe for Dr. Jose Madrigal.\par \par The documentation recorded by the scribe accurately reflects the service I personally performed and the decisions made by me. JOSE MADRIGAL MD\par

## 2019-05-01 PROBLEM — H26.9 UNSPECIFIED CATARACT: Chronic | Status: ACTIVE | Noted: 2019-04-22

## 2019-05-01 PROBLEM — L72.0 EPIDERMAL CYST: Chronic | Status: ACTIVE | Noted: 2019-04-22

## 2019-05-08 ENCOUNTER — OUTPATIENT (OUTPATIENT)
Dept: OUTPATIENT SERVICES | Facility: HOSPITAL | Age: 69
LOS: 1 days | End: 2019-05-08
Payer: COMMERCIAL

## 2019-05-08 ENCOUNTER — APPOINTMENT (OUTPATIENT)
Dept: CT IMAGING | Facility: IMAGING CENTER | Age: 69
End: 2019-05-08
Payer: MEDICARE

## 2019-05-08 DIAGNOSIS — Z98.49 CATARACT EXTRACTION STATUS, UNSPECIFIED EYE: Chronic | ICD-10-CM

## 2019-05-08 DIAGNOSIS — R91.1 SOLITARY PULMONARY NODULE: ICD-10-CM

## 2019-05-08 DIAGNOSIS — Z98.890 OTHER SPECIFIED POSTPROCEDURAL STATES: Chronic | ICD-10-CM

## 2019-05-08 DIAGNOSIS — R59.1 GENERALIZED ENLARGED LYMPH NODES: ICD-10-CM

## 2019-05-08 PROCEDURE — 71250 CT THORAX DX C-: CPT | Mod: 26

## 2019-05-08 PROCEDURE — 71250 CT THORAX DX C-: CPT

## 2019-05-13 ENCOUNTER — APPOINTMENT (OUTPATIENT)
Dept: UROLOGY | Facility: CLINIC | Age: 69
End: 2019-05-13

## 2019-05-15 ENCOUNTER — TRANSCRIPTION ENCOUNTER (OUTPATIENT)
Age: 69
End: 2019-05-15

## 2019-05-15 RX ORDER — SODIUM CHLORIDE 9 MG/ML
1000 INJECTION, SOLUTION INTRAVENOUS
Refills: 0 | Status: DISCONTINUED | OUTPATIENT
Start: 2019-05-16 | End: 2019-05-31

## 2019-05-15 NOTE — ASU PATIENT PROFILE, ADULT - PMH
Cataract    Epidermal cyst    Erectile dysfunction    Fall against object 2009    HLD (hyperlipidemia)    HTN (hypertension)    Malignant neoplasm of prostate    Stroke Nov 2011  Denies residual , No neurologist

## 2019-05-15 NOTE — ASU PATIENT PROFILE, ADULT - PSH
History of surgery  Cryosurgical ablation of prostate with flexible cystoscopy on 02/03/2012  Prostate seed implant 2006    S/P cataract surgery  Right eye

## 2019-05-16 ENCOUNTER — RESULT REVIEW (OUTPATIENT)
Age: 69
End: 2019-05-16

## 2019-05-16 ENCOUNTER — OUTPATIENT (OUTPATIENT)
Dept: OUTPATIENT SERVICES | Facility: HOSPITAL | Age: 69
LOS: 1 days | Discharge: HOME | End: 2019-05-16
Payer: MEDICARE

## 2019-05-16 VITALS
RESPIRATION RATE: 19 BRPM | HEART RATE: 62 BPM | DIASTOLIC BLOOD PRESSURE: 61 MMHG | TEMPERATURE: 98 F | SYSTOLIC BLOOD PRESSURE: 139 MMHG | OXYGEN SATURATION: 99 %

## 2019-05-16 VITALS
RESPIRATION RATE: 14 BRPM | DIASTOLIC BLOOD PRESSURE: 55 MMHG | OXYGEN SATURATION: 100 % | TEMPERATURE: 98 F | HEIGHT: 66 IN | HEART RATE: 64 BPM | WEIGHT: 199.96 LBS | SYSTOLIC BLOOD PRESSURE: 143 MMHG

## 2019-05-16 DIAGNOSIS — Z98.890 OTHER SPECIFIED POSTPROCEDURAL STATES: Chronic | ICD-10-CM

## 2019-05-16 DIAGNOSIS — Z98.49 CATARACT EXTRACTION STATUS, UNSPECIFIED EYE: Chronic | ICD-10-CM

## 2019-05-16 DIAGNOSIS — L72.0 EPIDERMAL CYST: ICD-10-CM

## 2019-05-16 PROCEDURE — 88312 SPECIAL STAINS GROUP 1: CPT | Mod: 26

## 2019-05-16 NOTE — ASU DISCHARGE PLAN (ADULT/PEDIATRIC) - CALL YOUR DOCTOR IF YOU HAVE ANY OF THE FOLLOWING:
Pain not relieved by Medications/Fever greater than (need to indicate Fahrenheit or Celsius)/Wound/Surgical Site with redness, or foul smelling discharge or pus/Bleeding that does not stop/Swelling that gets worse

## 2019-05-16 NOTE — ASU DISCHARGE PLAN (ADULT/PEDIATRIC) - ASU DC SPECIAL INSTRUCTIONSFT
FOLLOW-UP:  -   Please call [phone] to schedule a follow-up appointment with your surgeon,  Dr. Quiroz in 1 week.

## 2019-05-16 NOTE — BRIEF OPERATIVE NOTE - OPERATION/FINDINGS
elliptical incision made around 1cm sebaceous cyst in Lt pinna, just lateral to ear canal  skin flaps extended laterally  interrupted 4-0 monocryl knots to approximate the incision edge  interrupted 5-0 nylon knots for further tensile strength

## 2019-05-20 ENCOUNTER — APPOINTMENT (OUTPATIENT)
Dept: UROLOGY | Facility: CLINIC | Age: 69
End: 2019-05-20
Payer: MEDICARE

## 2019-05-20 DIAGNOSIS — R97.20 ELEVATED PROSTATE, SPECIFIC ANTIGEN [PSA]: ICD-10-CM

## 2019-05-20 PROCEDURE — 99214 OFFICE O/P EST MOD 30 MIN: CPT

## 2019-05-20 NOTE — REVIEW OF SYSTEMS
[Eyesight Problems] : eyesight problems [Joint Pain] : joint pain [Feeling Poorly] : not feeling poorly [Chest Pain] : no chest pain [Constipation] : no constipation

## 2019-05-20 NOTE — ADDENDUM
[FreeTextEntry1] : This note was authored by Felix Joyner working as a medical scribe for Dr. Jeremy Epperson. The note was reviewed, edited, and revised by Dr. Jeremy Epperson who is in agreement with the exam findings, and treatment plan. (5/20/19)

## 2019-05-20 NOTE — PHYSICAL EXAM
[General Appearance - Well Developed] : well developed [General Appearance - Well Nourished] : well nourished [Normal Appearance] : normal appearance [Well Groomed] : well groomed [General Appearance - In No Acute Distress] : no acute distress [Abdomen Tenderness] : non-tender [Costovertebral Angle Tenderness] : no ~M costovertebral angle tenderness [Abdomen Soft] : soft [Skin Color & Pigmentation] : normal skin color and pigmentation [Heart Rate And Rhythm] : Heart rate and rhythm were normal [Edema] : no peripheral edema [] : no respiratory distress [Respiration, Rhythm And Depth] : normal respiratory rhythm and effort [Exaggerated Use Of Accessory Muscles For Inspiration] : no accessory muscle use [Oriented To Time, Place, And Person] : oriented to person, place, and time [Affect] : the affect was normal [Mood] : the mood was normal [No Focal Deficits] : no focal deficits [Not Anxious] : not anxious [Normal Station and Gait] : the gait and station were normal for the patient's age [No Palpable Adenopathy] : no palpable adenopathy [Cervical Lymph Nodes Enlarged Anterior Bilaterally] : anterior cervical [Supraclavicular Lymph Nodes Enlarged Bilaterally] : supraclavicular [Cervical Lymph Nodes Enlarged Posterior Bilaterally] : posterior cervical [FreeTextEntry1] : No submandibular gland tenderness.\par

## 2019-05-20 NOTE — HISTORY OF PRESENT ILLNESS
[FreeTextEntry1] : Mr Tatum is a 68 year old man presented for follow up of prostate cancer and microscopic hematuria. The patient had a radioactive seed implant of the prostate in 2006. His PSA subsequently keenan and needle biopsy showed local persistence and recurrence of prostate cancer. I performed cryosurgical ablation of the prostate on February 3, 2012. PSA from 12/07/16 was 15.81 ng/mL. CT urogram 12/19/16 found no evidence of metastatic disease. Nuclear medicine whole body bone scan the same date found no evidence of osseous metastasis. Focal increased uptake in the right ankle at the region of the right lateral malleolus most likely post traumatic. Degenerative disease in the spine and major joints. X-rays were normal. The patient reported that he is currently struggling with some right knee pain. The patient returned and reported that his urination is good. However, when he was in [place] it seems like he was urinating more frequently due to stress. Throughout the day the patient urinates 4-5 times daily. At night, he urinates 2-3 times. The patient denied any gross hematuria, dysuria, or stress incontinence. The patient reported he is still doing the Trimix injections.  I renewed the patients prescription for Trimix because he ran out, however I still advised him to schedule a follow up with Dr. Wilkins.  \par 4/17/18: The patient returned and reported his urination has improved. Patient denied dysuria, gross hematuria, urgency, or incontinence. Noted slight urgency when he holds his bladder. PSA from 11/20 was 11.00 ng/mL. \par 9/17/18: The patient returned and reported his urination is adequate. Complained of increased urinary urgency. Denied any pain. PSA from 4/17/18 was 18.79 ng/mL. The patient will repeat CT scan and bone scan. \par \par 5/20/19: The patient returned today and reported that his urination is adequate. Occasionally has urinary urgency. Doesn't need medical therapy for his urination. Has erectile dysfunction. Notes that he started using Trimix injections again.\par CT chest abdomen and pelvis from 10/1/18 findings showed no hydronephrosis, renal or collecting system calculi; no filling defects in the collecting systems or urinary bladder on urographic phase images; 2 mm right middle lobe pulmonary nodule; right axillary lymph node is slightly increase in size compared with the prior study. NM bone img whole body from 10/1/18 findings showed no scan evidence of osseous metastasis; degenerative disease in the spine and major joints; no significant change compared to the previous bone scan dated 12/19/16.

## 2019-05-22 LAB — SURGICAL PATHOLOGY STUDY: SIGNIFICANT CHANGE UP

## 2019-05-26 LAB
ALBUMIN SERPL ELPH-MCNC: 4.8 G/DL
ALP BLD-CCNC: 86 U/L
ALT SERPL-CCNC: 15 U/L
ANION GAP SERPL CALC-SCNC: 13 MMOL/L
APPEARANCE: CLEAR
AST SERPL-CCNC: 18 U/L
BACTERIA UR CULT: NORMAL
BACTERIA: NEGATIVE
BASOPHILS # BLD AUTO: 0.06 K/UL
BASOPHILS NFR BLD AUTO: 0.7 %
BILIRUB SERPL-MCNC: 0.3 MG/DL
BILIRUBIN URINE: NEGATIVE
BLOOD URINE: NEGATIVE
BUN SERPL-MCNC: 16 MG/DL
CALCIUM SERPL-MCNC: 10.5 MG/DL
CHLORIDE SERPL-SCNC: 101 MMOL/L
CO2 SERPL-SCNC: 26 MMOL/L
COLOR: NORMAL
CREAT SERPL-MCNC: 1.25 MG/DL
EOSINOPHIL # BLD AUTO: 0.13 K/UL
EOSINOPHIL NFR BLD AUTO: 1.5 %
GLUCOSE QUALITATIVE U: NEGATIVE
GLUCOSE SERPL-MCNC: 96 MG/DL
HCT VFR BLD CALC: 45.7 %
HGB BLD-MCNC: 14.8 G/DL
HYALINE CASTS: 4 /LPF
IMM GRANULOCYTES NFR BLD AUTO: 0.5 %
KETONES URINE: NEGATIVE
LEUKOCYTE ESTERASE URINE: NEGATIVE
LYMPHOCYTES # BLD AUTO: 2.48 K/UL
LYMPHOCYTES NFR BLD AUTO: 28.4 %
MAN DIFF?: NORMAL
MCHC RBC-ENTMCNC: 28.7 PG
MCHC RBC-ENTMCNC: 32.4 GM/DL
MCV RBC AUTO: 88.7 FL
MICROSCOPIC-UA: NORMAL
MONOCYTES # BLD AUTO: 0.88 K/UL
MONOCYTES NFR BLD AUTO: 10.1 %
NEUTROPHILS # BLD AUTO: 5.15 K/UL
NEUTROPHILS NFR BLD AUTO: 58.8 %
NITRITE URINE: NEGATIVE
PH URINE: 6
PLATELET # BLD AUTO: 342 K/UL
POTASSIUM SERPL-SCNC: 4.8 MMOL/L
PROT SERPL-MCNC: 7.1 G/DL
PROTEIN URINE: ABNORMAL
PSA FREE FLD-MCNC: 5 %
PSA FREE SERPL-MCNC: 0.9 NG/ML
PSA SERPL-MCNC: 19.4 NG/ML
RBC # BLD: 5.15 M/UL
RBC # FLD: 15.7 %
RED BLOOD CELLS URINE: 2 /HPF
SODIUM SERPL-SCNC: 140 MMOL/L
SPECIFIC GRAVITY URINE: 1.01
SQUAMOUS EPITHELIAL CELLS: 1 /HPF
TESTOST SERPL-MCNC: 462 NG/DL
URINE CYTOLOGY: NORMAL
UROBILINOGEN URINE: NORMAL
WBC # FLD AUTO: 8.74 K/UL
WHITE BLOOD CELLS URINE: 1 /HPF

## 2019-09-16 ENCOUNTER — APPOINTMENT (OUTPATIENT)
Dept: UROLOGY | Facility: CLINIC | Age: 69
End: 2019-09-16
Payer: MEDICARE

## 2019-09-16 PROCEDURE — 99214 OFFICE O/P EST MOD 30 MIN: CPT

## 2019-09-16 NOTE — ADDENDUM
[FreeTextEntry1] : This note was authored by Felix Joyner working as scribe for Dr. Jeremy Epperson. I, Dr. Jeremy Epperson, have reviewed the content of this note and confirm it is true and accurate. I personally performed the history and physical examination and made all the decisions.\par 9/16/19

## 2019-09-16 NOTE — HISTORY OF PRESENT ILLNESS
[FreeTextEntry1] : Mr Tatum is a 69 year old man presented for follow up of prostate cancer and microscopic hematuria. The patient had a radioactive seed implant of the prostate in 2006. His PSA subsequently keenan and needle biopsy showed local persistence and recurrence of prostate cancer. I performed cryosurgical ablation of the prostate on February 3, 2012. PSA from 12/07/16 was 15.81 ng/mL. CT urogram 12/19/16 found no evidence of metastatic disease. Nuclear medicine whole body bone scan the same date found no evidence of osseous metastasis. Focal increased uptake in the right ankle at the region of the right lateral malleolus most likely post traumatic. Degenerative disease in the spine and major joints. X-rays were normal. The patient reported that he is currently struggling with some right knee pain. The patient returned and reported that his urination is good. However, when he was in [place] it seems like he was urinating more frequently due to stress. Throughout the day the patient urinates 4-5 times daily. At night, he urinates 2-3 times. The patient denied any gross hematuria, dysuria, or stress incontinence. The patient reported he is still doing the Trimix injections.  I renewed the patients prescription for Trimix because he ran out, however I still advised him to schedule a follow up with Dr. Wilkins.  \par 4/17/18: The patient returned and reported his urination has improved. Patient denied dysuria, gross hematuria, urgency, or incontinence. Noted slight urgency when he holds his bladder. PSA from 11/20 was 11.00 ng/mL. \par 9/17/18: The patient returned and reported his urination is adequate. Complained of increased urinary urgency. Denied any pain. PSA from 4/17/18 was 18.79 ng/mL. The patient will repeat CT scan and bone scan. \par 5/20/19: The patient returned today and reported that his urination is adequate. Occasionally has urinary urgency. Doesn't need medical therapy for his urination. Has erectile dysfunction. Notes that he started using Trimix injections again.\par CT chest abdomen and pelvis from 10/1/18 findings showed no hydronephrosis, renal or collecting system calculi; no filling defects in the collecting systems or urinary bladder on urographic phase images; 2 mm right middle lobe pulmonary nodule; right axillary lymph node is slightly increase in size compared with the prior study. NM bone img whole body from 10/1/18 findings showed no scan evidence of osseous metastasis; degenerative disease in the spine and major joints; no significant change compared to the previous bone scan dated 12/19/16.\par \par 9/16/19: Patient presents today for a follow up. He continues to reported nocturia x 3. Urinary urgency will occur only if he waits too long to go to the bathroom. Patient denies dysuria, gross hematuria, urgency or incontinence. He has a Hx of prostate cancer that was initially treated with seeds. His last PSA as of 5/20/19 was 19.40 ng/mL. He denies any new bone pain, just some arthritis in the knees bilaterally and some chronic low back pain.

## 2019-09-16 NOTE — ASSESSMENT
[FreeTextEntry1] : Mr Tatum is a 69 year old man presented for follow up of prostate cancer and microscopic hematuria. The patient had a radioactive seed implant of the prostate in 2006. His PSA subsequently keenan and needle biopsy showed local persistence and recurrence of prostate cancer. I performed cryosurgical ablation of the prostate on February 3, 2012. PSA from 12/07/16 was 15.81 ng/mL. CT urogram 12/19/16 found no evidence of metastatic disease. Nuclear medicine whole body bone scan the same date found no evidence of osseous metastasis. Focal increased uptake in the right ankle at the region of the right lateral malleolus most likely post traumatic. Degenerative disease in the spine and major joints. X-rays were normal. The patient reported that he is currently struggling with some right knee pain. The patient returned and reported that his urination is good. However, when he was in [place] it seems like he was urinating more frequently due to stress. Throughout the day the patient urinates 4-5 times daily. At night, he urinates 2-3 times. The patient denied any gross hematuria, dysuria, or stress incontinence. The patient reported he is still doing the Trimix injections.  I renewed the patients prescription for Trimix because he ran out, however I still advised him to schedule a follow up with Dr. Wilkins.  \par 4/17/18: The patient returned and reported his urination has improved. Patient denied dysuria, gross hematuria, urgency, or incontinence. Noted slight urgency when he holds his bladder. PSA from 11/20 was 11.00 ng/mL. \par 9/17/18: The patient returned and reported his urination is adequate. Complained of increased urinary urgency. Denied any pain. PSA from 4/17/18 was 18.79 ng/mL. The patient will repeat CT scan and bone scan. \par 5/20/19: The patient returned today and reported that his urination is adequate. Occasionally has urinary urgency. Doesn't need medical therapy for his urination. Has erectile dysfunction. Notes that he started using Trimix injections again.\par CT chest abdomen and pelvis from 10/1/18 findings showed no hydronephrosis, renal or collecting system calculi; no filling defects in the collecting systems or urinary bladder on urographic phase images; 2 mm right middle lobe pulmonary nodule; right axillary lymph node is slightly increase in size compared with the prior study. NM bone img whole body from 10/1/18 findings showed no scan evidence of osseous metastasis; degenerative disease in the spine and major joints; no significant change compared to the previous bone scan dated 12/19/16.\par \par 9/16/19: Patient presents today for a follow up. He continues to reported nocturia x 3. Urinary urgency will occur only if he waits too long to go to the bathroom. Patient denies dysuria, gross hematuria, urgency or incontinence. He has a Hx of prostate cancer that was initially treated with seeds. His last PSA as of 5/20/19 was 19.40 ng/mL. He denies any new bone pain, just some arthritis in the knees bilaterally and some chronic low back pain. \par \par Digital rectal exam found no suspicious rectal masses. Anal tone is normal. The prostate is non tender with normal texture, discrete borders and no nodules. It is a 45 gram transurethral resection size. Internal hemorrhoids that are inflamed. \par \par The patient produced a urine sample which will be sent for urinalysis, urine cytology and urine culture. \par Blood work today included comprehensive metabolic panel, CBC, PSA and testosterone. \par \par Due to his elevated PSA, it is possible that the prostate cancer has returned. Another PSA level will be taken today. If it remains elevated I will consider initiating hormone therapy to control the cancer. The patient inquires if there is any other forms of treatment apart from the hormone therapy. I discussed with him the possibility that lymph nodes be removed if the cancer is located there. An MRI of the prostate will be obtained first. \par \par An MRI of the prostate is ordered today due to his elevated PSA. \par I will also consider other imaging such as a bone scan once the MRI is completed. \par \par He is to follow up 1 week after the MRI is obtained.

## 2019-09-16 NOTE — PHYSICAL EXAM
[General Appearance - Well Developed] : well developed [General Appearance - Well Nourished] : well nourished [Normal Appearance] : normal appearance [Well Groomed] : well groomed [General Appearance - In No Acute Distress] : no acute distress [Abdomen Soft] : soft [Abdomen Tenderness] : non-tender [Costovertebral Angle Tenderness] : no ~M costovertebral angle tenderness [Skin Color & Pigmentation] : normal skin color and pigmentation [Heart Rate And Rhythm] : Heart rate and rhythm were normal [Edema] : no peripheral edema [] : no respiratory distress [Respiration, Rhythm And Depth] : normal respiratory rhythm and effort [Exaggerated Use Of Accessory Muscles For Inspiration] : no accessory muscle use [Affect] : the affect was normal [Oriented To Time, Place, And Person] : oriented to person, place, and time [Mood] : the mood was normal [Not Anxious] : not anxious [Normal Station and Gait] : the gait and station were normal for the patient's age [No Focal Deficits] : no focal deficits [No Palpable Adenopathy] : no palpable adenopathy [Cervical Lymph Nodes Enlarged Posterior Bilaterally] : posterior cervical [Cervical Lymph Nodes Enlarged Anterior Bilaterally] : anterior cervical [Supraclavicular Lymph Nodes Enlarged Bilaterally] : supraclavicular [FreeTextEntry1] : Smile Symmetric. Tongue is Midline. hand  5/5 bilaterally\par \par

## 2019-09-21 LAB
ALBUMIN SERPL ELPH-MCNC: 4.3 G/DL
ALP BLD-CCNC: 81 U/L
ALT SERPL-CCNC: 14 U/L
ANION GAP SERPL CALC-SCNC: 11 MMOL/L
APPEARANCE: CLEAR
AST SERPL-CCNC: 17 U/L
BACTERIA UR CULT: NORMAL
BACTERIA: NEGATIVE
BASOPHILS # BLD AUTO: 0.06 K/UL
BASOPHILS NFR BLD AUTO: 0.7 %
BILIRUB SERPL-MCNC: 0.2 MG/DL
BILIRUBIN URINE: NEGATIVE
BLOOD URINE: NEGATIVE
BUN SERPL-MCNC: 15 MG/DL
CALCIUM SERPL-MCNC: 10.1 MG/DL
CHLORIDE SERPL-SCNC: 103 MMOL/L
CO2 SERPL-SCNC: 26 MMOL/L
COLOR: NORMAL
CREAT SERPL-MCNC: 1.37 MG/DL
EOSINOPHIL # BLD AUTO: 0.18 K/UL
EOSINOPHIL NFR BLD AUTO: 2 %
GLUCOSE QUALITATIVE U: NEGATIVE
GLUCOSE SERPL-MCNC: 99 MG/DL
HCT VFR BLD CALC: 46.7 %
HGB BLD-MCNC: 14.5 G/DL
HYALINE CASTS: 1 /LPF
IMM GRANULOCYTES NFR BLD AUTO: 0.4 %
KETONES URINE: NEGATIVE
LEUKOCYTE ESTERASE URINE: NEGATIVE
LYMPHOCYTES # BLD AUTO: 2.44 K/UL
LYMPHOCYTES NFR BLD AUTO: 26.7 %
MAN DIFF?: NORMAL
MCHC RBC-ENTMCNC: 27.8 PG
MCHC RBC-ENTMCNC: 31 GM/DL
MCV RBC AUTO: 89.6 FL
MICROSCOPIC-UA: NORMAL
MONOCYTES # BLD AUTO: 0.9 K/UL
MONOCYTES NFR BLD AUTO: 9.8 %
NEUTROPHILS # BLD AUTO: 5.52 K/UL
NEUTROPHILS NFR BLD AUTO: 60.4 %
NITRITE URINE: NEGATIVE
PH URINE: 6
PLATELET # BLD AUTO: 348 K/UL
POTASSIUM SERPL-SCNC: 5.3 MMOL/L
PROT SERPL-MCNC: 6.9 G/DL
PROTEIN URINE: ABNORMAL
PSA SERPL-MCNC: 24.3 NG/ML
RBC # BLD: 5.21 M/UL
RBC # FLD: 15.1 %
RED BLOOD CELLS URINE: 1 /HPF
SODIUM SERPL-SCNC: 140 MMOL/L
SPECIFIC GRAVITY URINE: 1.01
SQUAMOUS EPITHELIAL CELLS: 0 /HPF
TESTOST SERPL-MCNC: 444 NG/DL
URINE CYTOLOGY: NORMAL
UROBILINOGEN URINE: NORMAL
WBC # FLD AUTO: 9.14 K/UL
WHITE BLOOD CELLS URINE: 0 /HPF

## 2019-10-07 ENCOUNTER — APPOINTMENT (OUTPATIENT)
Dept: UROLOGY | Facility: CLINIC | Age: 69
End: 2019-10-07

## 2019-10-15 ENCOUNTER — OTHER (OUTPATIENT)
Age: 69
End: 2019-10-15

## 2019-10-19 ENCOUNTER — FORM ENCOUNTER (OUTPATIENT)
Age: 69
End: 2019-10-19

## 2019-10-20 ENCOUNTER — APPOINTMENT (OUTPATIENT)
Dept: MRI IMAGING | Facility: IMAGING CENTER | Age: 69
End: 2019-10-20
Payer: MEDICARE

## 2019-10-20 ENCOUNTER — OUTPATIENT (OUTPATIENT)
Dept: OUTPATIENT SERVICES | Facility: HOSPITAL | Age: 69
LOS: 1 days | End: 2019-10-20
Payer: COMMERCIAL

## 2019-10-20 DIAGNOSIS — C61 MALIGNANT NEOPLASM OF PROSTATE: ICD-10-CM

## 2019-10-20 DIAGNOSIS — Z98.890 OTHER SPECIFIED POSTPROCEDURAL STATES: Chronic | ICD-10-CM

## 2019-10-20 DIAGNOSIS — R97.20 ELEVATED PROSTATE SPECIFIC ANTIGEN [PSA]: ICD-10-CM

## 2019-10-20 DIAGNOSIS — Z98.49 CATARACT EXTRACTION STATUS, UNSPECIFIED EYE: Chronic | ICD-10-CM

## 2019-10-20 PROCEDURE — 72197 MRI PELVIS W/O & W/DYE: CPT

## 2019-10-20 PROCEDURE — A9585: CPT

## 2019-10-20 PROCEDURE — 72197 MRI PELVIS W/O & W/DYE: CPT | Mod: 26

## 2019-10-24 ENCOUNTER — APPOINTMENT (OUTPATIENT)
Dept: UROLOGY | Facility: CLINIC | Age: 69
End: 2019-10-24
Payer: MEDICARE

## 2019-10-24 PROCEDURE — 99214 OFFICE O/P EST MOD 30 MIN: CPT

## 2019-10-28 NOTE — ASSESSMENT
[FreeTextEntry1] : Mr Tatum is a 69 year old man presented for follow up of prostate cancer and microscopic hematuria. The patient had a radioactive seed implant of the prostate in 2006. His PSA subsequently keenan and needle biopsy showed local persistence and recurrence of prostate cancer. I performed cryosurgical ablation of the prostate on February 3, 2012. PSA from 12/07/16 was 15.81 ng/mL. CT urogram 12/19/16 found no evidence of metastatic disease. Nuclear medicine whole body bone scan the same date found no evidence of osseous metastasis. Focal increased uptake in the right ankle at the region of the right lateral malleolus most likely post traumatic. Degenerative disease in the spine and major joints. X-rays were normal. The patient reported that he is currently struggling with some right knee pain. The patient returned and reported that his urination is good. However, when he was in [place] it seems like he was urinating more frequently due to stress. Throughout the day the patient urinates 4-5 times daily. At night, he urinates 2-3 times. The patient denied any gross hematuria, dysuria, or stress incontinence. The patient reported he is still doing the Trimix injections.  I renewed the patients prescription for Trimix because he ran out, however I still advised him to schedule a follow up with Dr. Wilkins.  \par 4/17/18: The patient returned and reported his urination has improved. Patient denied dysuria, gross hematuria, urgency, or incontinence. Noted slight urgency when he holds his bladder. PSA from 11/20 was 11.00 ng/mL. \par 9/17/18: The patient returned and reported his urination is adequate. Complained of increased urinary urgency. Denied any pain. PSA from 4/17/18 was 18.79 ng/mL. The patient will repeat CT scan and bone scan. \par 5/20/19: The patient returned today and reported that his urination is adequate. Occasionally has urinary urgency. Doesn't need medical therapy for his urination. Has erectile dysfunction. Notes that he started using Trimix injections again.\par CT chest abdomen and pelvis from 10/1/18 findings showed no hydronephrosis, renal or collecting system calculi; no filling defects in the collecting systems or urinary bladder on urographic phase images; 2 mm right middle lobe pulmonary nodule; right axillary lymph node is slightly increase in size compared with the prior study. NM bone img whole body from 10/1/18 findings showed no scan evidence of osseous metastasis; degenerative disease in the spine and major joints; no significant change compared to the previous bone scan dated 12/19/16.\par 9/16/19: Patient presents today for a follow up. He continues to reported nocturia x 3. Urinary urgency will occur only if he waits too long to go to the bathroom. Patient denies dysuria, gross hematuria, urgency or incontinence. He has a Hx of prostate cancer that was initially treated with seeds. His last PSA as of 5/20/19 was 19.40 ng/mL. He denies any new bone pain, just some arthritis in the knees bilaterally and some chronic low back pain. \par \par 10/24/19: Patient presents today for a follow up. He states that he is feeling well today. An MRI of the prostate was ordered at his last visit and was completed on 10/20/19. Seed implantation for prostate cancer was done in 2006 and cryotherapy was also used in 2012. His last PSA from 9/16/19 was 24.30 ng/mL leading to the MRI. He also reports increasing his consumption of water as advised due to his creatinine level being 1.37 mg/dL. \par \par Upon review of the MRI it is determined that: Blood within the seminal vesicles, which can limit evaluation. Postcontrast enhancement in the mid \par and right seminal vesicles is suspicious for recurrent disease. \par *PIRADS 5 - Very high (clinically significant cancer is highly likely to be present)\par \par A Bone scan is also ordered today to monitor for the presence of metastatic disease. If his bone scan is unremarkable an Axumin scan will be ordered. Based on the results of both images I will consider cryotherapy again. \par \par We discussed initiating hormone therapy to manage his prostate cancer. He is sexually active and states that he desire is good. We discussed that if he were to begin the hormone therapy his desire would decrease significantly. \par \par He is to follow up once the bone scan is completed to discuss the results.

## 2019-10-28 NOTE — PHYSICAL EXAM
[General Appearance - Well Developed] : well developed [General Appearance - Well Nourished] : well nourished [Normal Appearance] : normal appearance [Well Groomed] : well groomed [General Appearance - In No Acute Distress] : no acute distress [Abdomen Tenderness] : non-tender [Abdomen Soft] : soft [Costovertebral Angle Tenderness] : no ~M costovertebral angle tenderness [Skin Color & Pigmentation] : normal skin color and pigmentation [Heart Rate And Rhythm] : Heart rate and rhythm were normal [Edema] : no peripheral edema [] : no respiratory distress [Respiration, Rhythm And Depth] : normal respiratory rhythm and effort [Exaggerated Use Of Accessory Muscles For Inspiration] : no accessory muscle use [Affect] : the affect was normal [Oriented To Time, Place, And Person] : oriented to person, place, and time [Mood] : the mood was normal [Not Anxious] : not anxious [Normal Station and Gait] : the gait and station were normal for the patient's age [No Focal Deficits] : no focal deficits [No Palpable Adenopathy] : no palpable adenopathy [Cervical Lymph Nodes Enlarged Posterior Bilaterally] : posterior cervical [Supraclavicular Lymph Nodes Enlarged Bilaterally] : supraclavicular [Cervical Lymph Nodes Enlarged Anterior Bilaterally] : anterior cervical [FreeTextEntry1] : No submandibular gland tenderness.\par

## 2019-10-28 NOTE — HISTORY OF PRESENT ILLNESS
[FreeTextEntry1] : Mr Tatum is a 69 year old man presented for follow up of prostate cancer and microscopic hematuria. The patient had a radioactive seed implant of the prostate in 2006. His PSA subsequently keenan and needle biopsy showed local persistence and recurrence of prostate cancer. I performed cryosurgical ablation of the prostate on February 3, 2012. PSA from 12/07/16 was 15.81 ng/mL. CT urogram 12/19/16 found no evidence of metastatic disease. Nuclear medicine whole body bone scan the same date found no evidence of osseous metastasis. Focal increased uptake in the right ankle at the region of the right lateral malleolus most likely post traumatic. Degenerative disease in the spine and major joints. X-rays were normal. The patient reported that he is currently struggling with some right knee pain. The patient returned and reported that his urination is good. However, when he was in [place] it seems like he was urinating more frequently due to stress. Throughout the day the patient urinates 4-5 times daily. At night, he urinates 2-3 times. The patient denied any gross hematuria, dysuria, or stress incontinence. The patient reported he is still doing the Trimix injections.  I renewed the patients prescription for Trimix because he ran out, however I still advised him to schedule a follow up with Dr. Wilkins.  \par 4/17/18: The patient returned and reported his urination has improved. Patient denied dysuria, gross hematuria, urgency, or incontinence. Noted slight urgency when he holds his bladder. PSA from 11/20 was 11.00 ng/mL. \par 9/17/18: The patient returned and reported his urination is adequate. Complained of increased urinary urgency. Denied any pain. PSA from 4/17/18 was 18.79 ng/mL. The patient will repeat CT scan and bone scan. \par 5/20/19: The patient returned today and reported that his urination is adequate. Occasionally has urinary urgency. Doesn't need medical therapy for his urination. Has erectile dysfunction. Notes that he started using Trimix injections again.\par CT chest abdomen and pelvis from 10/1/18 findings showed no hydronephrosis, renal or collecting system calculi; no filling defects in the collecting systems or urinary bladder on urographic phase images; 2 mm right middle lobe pulmonary nodule; right axillary lymph node is slightly increase in size compared with the prior study. NM bone img whole body from 10/1/18 findings showed no scan evidence of osseous metastasis; degenerative disease in the spine and major joints; no significant change compared to the previous bone scan dated 12/19/16.\par 9/16/19: Patient presents today for a follow up. He continues to reported nocturia x 3. Urinary urgency will occur only if he waits too long to go to the bathroom. Patient denies dysuria, gross hematuria, urgency or incontinence. He has a Hx of prostate cancer that was initially treated with seeds. His last PSA as of 5/20/19 was 19.40 ng/mL. He denies any new bone pain, just some arthritis in the knees bilaterally and some chronic low back pain. \par \par 10/24/19: Patient presents today for a follow up. He states that he is feeling well today. An MRI of the prostate was ordered at his last visit and was completed on 10/20/19. Seed implantation for prostate cancer was done in 2006 and cryotherapy was also used in 2012. His last PSA from 9/16/19 was 24.30 ng/mL leading to the MRI. He also reports increasing his consumption of water as advised due to his creatinine level being 1.37 mg/dL.

## 2019-10-28 NOTE — ADDENDUM
[FreeTextEntry1] : This note was authored by Felix Joyner working as scribe for Dr. Jeremy Epperson. I, Dr. Jeremy Epperson, have reviewed the content of this note and confirm it is true and accurate. I personally performed the history and physical examination and made all the decisions.\par 10/24/19

## 2019-10-29 ENCOUNTER — FORM ENCOUNTER (OUTPATIENT)
Age: 69
End: 2019-10-29

## 2019-10-30 ENCOUNTER — APPOINTMENT (OUTPATIENT)
Dept: UROLOGY | Facility: CLINIC | Age: 69
End: 2019-10-30
Payer: MEDICARE

## 2019-10-30 ENCOUNTER — APPOINTMENT (OUTPATIENT)
Dept: NUCLEAR MEDICINE | Facility: IMAGING CENTER | Age: 69
End: 2019-10-30
Payer: MEDICARE

## 2019-10-30 ENCOUNTER — OUTPATIENT (OUTPATIENT)
Dept: OUTPATIENT SERVICES | Facility: HOSPITAL | Age: 69
LOS: 1 days | End: 2019-10-30
Payer: COMMERCIAL

## 2019-10-30 DIAGNOSIS — C61 MALIGNANT NEOPLASM OF PROSTATE: ICD-10-CM

## 2019-10-30 DIAGNOSIS — Z98.890 OTHER SPECIFIED POSTPROCEDURAL STATES: Chronic | ICD-10-CM

## 2019-10-30 DIAGNOSIS — N28.9 DISORDER OF KIDNEY AND URETER, UNSPECIFIED: ICD-10-CM

## 2019-10-30 DIAGNOSIS — Z98.49 CATARACT EXTRACTION STATUS, UNSPECIFIED EYE: Chronic | ICD-10-CM

## 2019-10-30 DIAGNOSIS — M10.9 GOUT, UNSPECIFIED: ICD-10-CM

## 2019-10-30 PROCEDURE — 78999 UNLISTED MISC PX DX NUC MED: CPT

## 2019-10-30 PROCEDURE — 78306 BONE IMAGING WHOLE BODY: CPT | Mod: 26

## 2019-10-30 PROCEDURE — 78803 RP LOCLZJ TUM SPECT 1 AREA: CPT

## 2019-10-30 PROCEDURE — 99214 OFFICE O/P EST MOD 30 MIN: CPT

## 2019-10-30 PROCEDURE — 78999A: CUSTOM | Mod: 26

## 2019-10-30 PROCEDURE — 78306 BONE IMAGING WHOLE BODY: CPT

## 2019-10-30 PROCEDURE — 78320: CPT | Mod: 26

## 2019-10-30 PROCEDURE — A9561: CPT

## 2019-10-30 NOTE — ADDENDUM
[FreeTextEntry1] : This note was authored by Felix Joyner working as scribe for Dr. Jeremy Epperson. I, Dr. Jeremy Epperson, have reviewed the content of this note and confirm it is true and accurate. I personally performed the history and physical examination and made all the decisions.\par 10/30/19

## 2019-10-30 NOTE — PHYSICAL EXAM
[General Appearance - Well Developed] : well developed [General Appearance - Well Nourished] : well nourished [Normal Appearance] : normal appearance [Well Groomed] : well groomed [General Appearance - In No Acute Distress] : no acute distress [Abdomen Soft] : soft [Abdomen Tenderness] : non-tender [Costovertebral Angle Tenderness] : no ~M costovertebral angle tenderness [Skin Color & Pigmentation] : normal skin color and pigmentation [Heart Rate And Rhythm] : Heart rate and rhythm were normal [Edema] : no peripheral edema [] : no respiratory distress [Respiration, Rhythm And Depth] : normal respiratory rhythm and effort [Exaggerated Use Of Accessory Muscles For Inspiration] : no accessory muscle use [Oriented To Time, Place, And Person] : oriented to person, place, and time [Affect] : the affect was normal [Mood] : the mood was normal [Not Anxious] : not anxious [Normal Station and Gait] : the gait and station were normal for the patient's age [No Focal Deficits] : no focal deficits [No Palpable Adenopathy] : no palpable adenopathy [Cervical Lymph Nodes Enlarged Posterior Bilaterally] : posterior cervical [Cervical Lymph Nodes Enlarged Anterior Bilaterally] : anterior cervical [Supraclavicular Lymph Nodes Enlarged Bilaterally] : supraclavicular [FreeTextEntry1] : No submandibular gland tenderness.\par

## 2019-10-30 NOTE — HISTORY OF PRESENT ILLNESS
[FreeTextEntry1] : Mr Tatum is a 69 year old man presented for follow up of prostate cancer and microscopic hematuria. The patient had a radioactive seed implant of the prostate in 2006. His PSA subsequently keenan and needle biopsy showed local persistence and recurrence of prostate cancer. I performed cryosurgical ablation of the prostate on February 3, 2012. PSA from 12/07/16 was 15.81 ng/mL. CT urogram 12/19/16 found no evidence of metastatic disease. Nuclear medicine whole body bone scan the same date found no evidence of osseous metastasis. Focal increased uptake in the right ankle at the region of the right lateral malleolus most likely post traumatic. Degenerative disease in the spine and major joints. X-rays were normal. The patient reported that he is currently struggling with some right knee pain. The patient returned and reported that his urination is good. However, when he was in [place] it seems like he was urinating more frequently due to stress. Throughout the day the patient urinates 4-5 times daily. At night, he urinates 2-3 times. The patient denied any gross hematuria, dysuria, or stress incontinence. The patient reported he is still doing the Trimix injections.  I renewed the patients prescription for Trimix because he ran out, however I still advised him to schedule a follow up with Dr. Wilkins.  \par 4/17/18: The patient returned and reported his urination has improved. Patient denied dysuria, gross hematuria, urgency, or incontinence. Noted slight urgency when he holds his bladder. PSA from 11/20 was 11.00 ng/mL. \par 9/17/18: The patient returned and reported his urination is adequate. Complained of increased urinary urgency. Denied any pain. PSA from 4/17/18 was 18.79 ng/mL. The patient will repeat CT scan and bone scan. \par 5/20/19: The patient returned today and reported that his urination is adequate. Occasionally has urinary urgency. Doesn't need medical therapy for his urination. Has erectile dysfunction. Notes that he started using Trimix injections again.\par CT chest abdomen and pelvis from 10/1/18 findings showed no hydronephrosis, renal or collecting system calculi; no filling defects in the collecting systems or urinary bladder on urographic phase images; 2 mm right middle lobe pulmonary nodule; right axillary lymph node is slightly increase in size compared with the prior study. NM bone img whole body from 10/1/18 findings showed no scan evidence of osseous metastasis; degenerative disease in the spine and major joints; no significant change compared to the previous bone scan dated 12/19/16.\par 9/16/19: Patient presents today for a follow up. He continues to reported nocturia x 3. Urinary urgency will occur only if he waits too long to go to the bathroom. Patient denies dysuria, gross hematuria, urgency or incontinence. He has a Hx of prostate cancer that was initially treated with seeds. His last PSA as of 5/20/19 was 19.40 ng/mL. He denies any new bone pain, just some arthritis in the knees bilaterally and some chronic low back pain. \par 10/24/19: Patient presents today for a follow up. He states that he is feeling well today. An MRI of the prostate was ordered at his last visit and was completed on 10/20/19. Seed implantation for prostate cancer was done in 2006 and cryotherapy was also used in 2012. His last PSA from 9/16/19 was 24.30 ng/mL leading to the MRI. He also reports increasing his consumption of water as advised due to his creatinine level being 1.37 mg/dL. \par \par 10/30/19: Patient presents today for a follow up. A bone scan was ordered at his last visit and he is here to discuss the results. He is interested in discussing treatment options for his prostate cancer if it is limited to the prostate.

## 2019-10-30 NOTE — ASSESSMENT
[FreeTextEntry1] : Mr Tatum is a 69 year old man presented for follow up of prostate cancer and microscopic hematuria. The patient had a radioactive seed implant of the prostate in 2006. His PSA subsequently keenan and needle biopsy showed local persistence and recurrence of prostate cancer. I performed cryosurgical ablation of the prostate on February 3, 2012. PSA from 12/07/16 was 15.81 ng/mL. CT urogram 12/19/16 found no evidence of metastatic disease. Nuclear medicine whole body bone scan the same date found no evidence of osseous metastasis. Focal increased uptake in the right ankle at the region of the right lateral malleolus most likely post traumatic. Degenerative disease in the spine and major joints. X-rays were normal. The patient reported that he is currently struggling with some right knee pain. The patient returned and reported that his urination is good. However, when he was in [place] it seems like he was urinating more frequently due to stress. Throughout the day the patient urinates 4-5 times daily. At night, he urinates 2-3 times. The patient denied any gross hematuria, dysuria, or stress incontinence. The patient reported he is still doing the Trimix injections.  I renewed the patients prescription for Trimix because he ran out, however I still advised him to schedule a follow up with Dr. Wilkins.  \par 4/17/18: The patient returned and reported his urination has improved. Patient denied dysuria, gross hematuria, urgency, or incontinence. Noted slight urgency when he holds his bladder. PSA from 11/20 was 11.00 ng/mL. \par 9/17/18: The patient returned and reported his urination is adequate. Complained of increased urinary urgency. Denied any pain. PSA from 4/17/18 was 18.79 ng/mL. The patient will repeat CT scan and bone scan. \par 5/20/19: The patient returned today and reported that his urination is adequate. Occasionally has urinary urgency. Doesn't need medical therapy for his urination. Has erectile dysfunction. Notes that he started using Trimix injections again.\par CT chest abdomen and pelvis from 10/1/18 findings showed no hydronephrosis, renal or collecting system calculi; no filling defects in the collecting systems or urinary bladder on urographic phase images; 2 mm right middle lobe pulmonary nodule; right axillary lymph node is slightly increase in size compared with the prior study. NM bone img whole body from 10/1/18 findings showed no scan evidence of osseous metastasis; degenerative disease in the spine and major joints; no significant change compared to the previous bone scan dated 12/19/16.\par 9/16/19: Patient presents today for a follow up. He continues to reported nocturia x 3. Urinary urgency will occur only if he waits too long to go to the bathroom. Patient denies dysuria, gross hematuria, urgency or incontinence. He has a Hx of prostate cancer that was initially treated with seeds. His last PSA as of 5/20/19 was 19.40 ng/mL. He denies any new bone pain, just some arthritis in the knees bilaterally and some chronic low back pain. \par 10/24/19: Patient presents today for a follow up. He states that he is feeling well today. An MRI of the prostate was ordered at his last visit and was completed on 10/20/19. Seed implantation for prostate cancer was done in 2006 and cryotherapy was also used in 2012. His last PSA from 9/16/19 was 24.30 ng/mL leading to the MRI. He also reports increasing his consumption of water as advised due to his creatinine level being 1.37 mg/dL. \par \par 10/30/19: Patient presents today for a follow up. A bone scan was ordered at his last visit and he is here to discuss the results. He is interested in discussing treatment options for his prostate cancer if it is limited to the prostate. \par \par Upon review of the bone scan it is determined that: \par FINDINGS: There are degenerative changes in the spine and major joints. Previously noted focus in the right \par lateral malleolus is less prominent. There is physiologic tracer distribution in the remainder of the visualized \par skeleton. No foci of abnormally increased or decreased tracer accumulation are identified to suggest the \par presence of osseous metastasis. \par Both kidneys are visualized and appear symmetric. \par IMPRESSION: Bone scan demonstrates: \par No scan evidence of osseous metastasis. \par Degenerative disease in the spine and major joints.\par \par Increased uptake in right ankle and foot is due to clinical gout. \par Increased uptake in left mandible is due to a tender spot, likely dental related, not suspicious for prostate cancer. \par \par An Axumin Scan is ordered today. I would like to have this image obtained before considering him as a viable candidate for cryosurgery. \par \par He is to follow up a few days after the Axumin scan is obtained to discuss the results. \par \par We discussed initiating hormone therapy to manage his prostate cancer. He is sexually active and states that he desire is good. We discussed that if he were to begin the hormone therapy his desire would decrease significantly.

## 2019-11-05 ENCOUNTER — APPOINTMENT (OUTPATIENT)
Dept: CT IMAGING | Facility: IMAGING CENTER | Age: 69
End: 2019-11-05

## 2019-11-13 ENCOUNTER — OTHER (OUTPATIENT)
Age: 69
End: 2019-11-13

## 2019-11-19 ENCOUNTER — APPOINTMENT (OUTPATIENT)
Dept: THORACIC SURGERY | Facility: CLINIC | Age: 69
End: 2019-11-19

## 2019-12-01 ENCOUNTER — FORM ENCOUNTER (OUTPATIENT)
Age: 69
End: 2019-12-01

## 2019-12-02 ENCOUNTER — APPOINTMENT (OUTPATIENT)
Dept: NUCLEAR MEDICINE | Facility: IMAGING CENTER | Age: 69
End: 2019-12-02
Payer: MEDICARE

## 2019-12-02 ENCOUNTER — OUTPATIENT (OUTPATIENT)
Dept: OUTPATIENT SERVICES | Facility: HOSPITAL | Age: 69
LOS: 1 days | End: 2019-12-02
Payer: COMMERCIAL

## 2019-12-02 DIAGNOSIS — Z98.49 CATARACT EXTRACTION STATUS, UNSPECIFIED EYE: Chronic | ICD-10-CM

## 2019-12-02 DIAGNOSIS — C61 MALIGNANT NEOPLASM OF PROSTATE: ICD-10-CM

## 2019-12-02 DIAGNOSIS — Z98.890 OTHER SPECIFIED POSTPROCEDURAL STATES: Chronic | ICD-10-CM

## 2019-12-02 PROCEDURE — 78815 PET IMAGE W/CT SKULL-THIGH: CPT

## 2019-12-02 PROCEDURE — 78815 PET IMAGE W/CT SKULL-THIGH: CPT | Mod: 26,PS

## 2019-12-02 PROCEDURE — A9588: CPT

## 2019-12-09 ENCOUNTER — APPOINTMENT (OUTPATIENT)
Dept: UROLOGY | Facility: CLINIC | Age: 69
End: 2019-12-09
Payer: MEDICARE

## 2019-12-09 DIAGNOSIS — Z87.440 PERSONAL HISTORY OF URINARY (TRACT) INFECTIONS: ICD-10-CM

## 2019-12-09 DIAGNOSIS — R31.29 OTHER MICROSCOPIC HEMATURIA: ICD-10-CM

## 2019-12-09 DIAGNOSIS — N39.41 URGE INCONTINENCE: ICD-10-CM

## 2019-12-09 PROCEDURE — 99214 OFFICE O/P EST MOD 30 MIN: CPT

## 2019-12-09 NOTE — ADDENDUM
[FreeTextEntry1] : This note was authored by Carly Irwin working as a scribe for Dr. Jeremy Epperson.\par \par I, Dr. Jeremy Epperson, have reviewed the content of this note and confirm it is true and accurate. I personally performed the history and physical examination and made all the decisions.\par

## 2019-12-09 NOTE — ASSESSMENT
[FreeTextEntry1] : 12/9/19: Patient presents today for a follow up to discuss CT scan results. 12/2/19 Pet/CT scan shows Evidence off recurrent disease in apex of prostate gland and central aspect of seminal vesicles, corresponding to abnormalities seen on prostate MRI dated 10/20/19. Patient has been using Trimex which has helped him have adequate erections. Discussed r/b/a of cryosurgery, vs hormone therapy vs waiting and watching. \par \par We discussed initiating hormone therapy to manage his prostate cancer. He is sexually active and states that he desire is good. We discussed that if he were to begin the hormone therapy his desire would decrease significantly. We also discussed waiting and watching. The patient was in agreement with this plan. We discussed risks and benefits of salvage cryo surgery.\par \par He will return in 1 month to recheck PSA and have ARTEMIO.

## 2019-12-09 NOTE — HISTORY OF PRESENT ILLNESS
[FreeTextEntry1] : Mr Tatum is a 69 year old man presented for follow up of prostate cancer and microscopic hematuria. The patient had a radioactive seed implant of the prostate in 2006. His PSA subsequently keenan and needle biopsy showed local persistence and recurrence of prostate cancer. I performed cryosurgical ablation of the prostate on February 3, 2012. PSA from 12/07/16 was 15.81 ng/mL. CT urogram 12/19/16 found no evidence of metastatic disease. Nuclear medicine whole body bone scan the same date found no evidence of osseous metastasis. Focal increased uptake in the right ankle at the region of the right lateral malleolus most likely post traumatic. Degenerative disease in the spine and major joints. X-rays were normal. The patient reported that he is currently struggling with some right knee pain. The patient returned and reported that his urination is good. However, when he was in [place] it seems like he was urinating more frequently due to stress. Throughout the day the patient urinates 4-5 times daily. At night, he urinates 2-3 times. The patient denied any gross hematuria, dysuria, or stress incontinence. The patient reported he is still doing the Trimix injections.  I renewed the patients prescription for Trimix because he ran out, however I still advised him to schedule a follow up with Dr. Wilkins.  \par 4/17/18: The patient returned and reported his urination has improved. Patient denied dysuria, gross hematuria, urgency, or incontinence. Noted slight urgency when he holds his bladder. PSA from 11/20 was 11.00 ng/mL. \par 9/17/18: The patient returned and reported his urination is adequate. Complained of increased urinary urgency. Denied any pain. PSA from 4/17/18 was 18.79 ng/mL. The patient will repeat CT scan and bone scan. \par 5/20/19: The patient returned today and reported that his urination is adequate. Occasionally has urinary urgency. Doesn't need medical therapy for his urination. Has erectile dysfunction. Notes that he started using Trimix injections again.\par CT chest abdomen and pelvis from 10/1/18 findings showed no hydronephrosis, renal or collecting system calculi; no filling defects in the collecting systems or urinary bladder on urographic phase images; 2 mm right middle lobe pulmonary nodule; right axillary lymph node is slightly increase in size compared with the prior study. NM bone img whole body from 10/1/18 findings showed no scan evidence of osseous metastasis; degenerative disease in the spine and major joints; no significant change compared to the previous bone scan dated 12/19/16.\par 9/16/19: Patient presents today for a follow up. He continues to reported nocturia x 3. Urinary urgency will occur only if he waits too long to go to the bathroom. Patient denies dysuria, gross hematuria, urgency or incontinence. He has a Hx of prostate cancer that was initially treated with seeds. His last PSA as of 5/20/19 was 19.40 ng/mL. He denies any new bone pain, just some arthritis in the knees bilaterally and some chronic low back pain. \par 10/24/19: Patient presents today for a follow up. He states that he is feeling well today. An MRI of the prostate was ordered at his last visit and was completed on 10/20/19. Seed implantation for prostate cancer was done in 2006 and cryotherapy was also used in 2012. His last PSA from 9/16/19 was 24.30 ng/mL leading to the MRI. He also reports increasing his consumption of water as advised due to his creatinine level being 1.37 mg/dL. \par 10/30/19: Patient presents today for a follow up. A bone scan was ordered at his last visit and he is here to discuss the results. He is interested in discussing treatment options for his prostate cancer if it is limited to the prostate. \par \par 12/9/19: Patient presents today for a follow up to discuss CT scan results. 12/2/19 Pet/CT scan shows Evidence off recurrent disease in apex of prostate gland and central aspect of seminal vesicles, corresponding to abnormalities seen on prostate MRI dated 10/20/19. Patient has been using Trimex which has helped him have adequate erections. Discussed r/b/a of cryosurgery, vs biopsy vs waiting and watching.

## 2020-03-17 ENCOUNTER — APPOINTMENT (OUTPATIENT)
Dept: UROLOGY | Facility: CLINIC | Age: 70
End: 2020-03-17

## 2020-06-08 ENCOUNTER — APPOINTMENT (OUTPATIENT)
Dept: NUCLEAR MEDICINE | Facility: IMAGING CENTER | Age: 70
End: 2020-06-08

## 2020-08-24 ENCOUNTER — APPOINTMENT (OUTPATIENT)
Dept: NUCLEAR MEDICINE | Facility: IMAGING CENTER | Age: 70
End: 2020-08-24
Payer: MEDICARE

## 2020-08-24 ENCOUNTER — OUTPATIENT (OUTPATIENT)
Dept: OUTPATIENT SERVICES | Facility: HOSPITAL | Age: 70
LOS: 1 days | End: 2020-08-24
Payer: COMMERCIAL

## 2020-08-24 DIAGNOSIS — Z98.49 CATARACT EXTRACTION STATUS, UNSPECIFIED EYE: Chronic | ICD-10-CM

## 2020-08-24 DIAGNOSIS — Z98.890 OTHER SPECIFIED POSTPROCEDURAL STATES: Chronic | ICD-10-CM

## 2020-08-24 DIAGNOSIS — C61 MALIGNANT NEOPLASM OF PROSTATE: ICD-10-CM

## 2020-08-24 PROCEDURE — 78815 PET IMAGE W/CT SKULL-THIGH: CPT | Mod: 26,PS

## 2020-08-24 PROCEDURE — A9588: CPT

## 2020-08-24 PROCEDURE — 78815 PET IMAGE W/CT SKULL-THIGH: CPT

## 2022-03-31 ENCOUNTER — APPOINTMENT (OUTPATIENT)
Dept: UROLOGY | Facility: CLINIC | Age: 72
End: 2022-03-31
Payer: MEDICARE

## 2022-03-31 VITALS
HEIGHT: 68 IN | DIASTOLIC BLOOD PRESSURE: 70 MMHG | WEIGHT: 195 LBS | SYSTOLIC BLOOD PRESSURE: 157 MMHG | BODY MASS INDEX: 29.55 KG/M2 | HEART RATE: 67 BPM

## 2022-03-31 DIAGNOSIS — N52.9 MALE ERECTILE DYSFUNCTION, UNSPECIFIED: ICD-10-CM

## 2022-03-31 DIAGNOSIS — C61 MALIGNANT NEOPLASM OF PROSTATE: ICD-10-CM

## 2022-03-31 PROCEDURE — 99214 OFFICE O/P EST MOD 30 MIN: CPT

## 2022-03-31 RX ORDER — PAPAVERINE HYDROCHLORIDE 30 MG/ML
30 INJECTION, SOLUTION INTRAVENOUS
Qty: 5 | Refills: 1 | Status: ACTIVE | COMMUNITY
Start: 2019-04-25 | End: 1900-01-01

## 2022-03-31 NOTE — PHYSICAL EXAM
[General Appearance - Well Developed] : well developed [General Appearance - Well Nourished] : well nourished [Normal Appearance] : normal appearance [Well Groomed] : well groomed [General Appearance - In No Acute Distress] : no acute distress [Bowel Sounds] : normal bowel sounds [Abdomen Soft] : soft [Abdomen Tenderness] : non-tender [Costovertebral Angle Tenderness] : no ~M costovertebral angle tenderness [Urethral Meatus] : meatus normal [Urinary Bladder Findings] : the bladder was normal on palpation [Scrotum] : the scrotum was normal [Testes Mass (___cm)] : there were no testicular masses [No Prostate Nodules] : no prostate nodules [] : no rash [Oriented To Time, Place, And Person] : oriented to person, place, and time [Affect] : the affect was normal [Mood] : the mood was normal [Not Anxious] : not anxious [Normal Station and Gait] : the gait and station were normal for the patient's age [FreeTextEntry1] : no plaques induration or tenderness along the penile shaft

## 2022-03-31 NOTE — ASSESSMENT
[FreeTextEntry1] : the patient has returned for followup. He is on lupron for rising PSA.  He was last seen in 2019.  He has been injecting 0.4 cc of the trimix. He is using the 29 ga needle with better results. He needs a refill. Not ready for IPP yet. \par \par I have renewed his trimix.We will discuss the efficacy in 3 months.\par \par Consultation: 30 minutes:  10 minutes reviewing his history and performing a physical examination.  20 minutes reviewing the ultrasound, writing for prescription medications and blood studies ,discussing treatment options and writing his note. There was also additional time in preparation for today's visit.\par

## 2022-03-31 NOTE — HISTORY OF PRESENT ILLNESS
[FreeTextEntry1] : the patient has returned for followup. He is on lupron for rising PSA.  He was last seen in 2019.  He has been injecting 0.4 cc of the trimix. He is using the 29 ga needle with better results. He needs a refill. Not ready for IPP yet. \par \par PMH: Patient initially presented  with a chief complaint of erectile dysfunction.  Patient had cryosurgery of the prostate in February of this year. Prior to surgery had decrease in his erections are responding to Viagra at 100 mg and use. However, after surgery he said a decrease in his ability to obtain or maintain erections even with these medications. His erections are not modified with the degree of sexual stimulation.   He states that his erections presently are often less than 2 out of 10 in both tumescence and rigidity, insufficient for penetration.   He often ejaculates through a flaccid phallus.  He has difficulty maintaining an erection. He describes a normal libido.  His sexual dysfunction occurs with both sexual relations and masturbation.  His erections are not improved with PDE5 inhibitors.   His partner is understanding and was unable to be with him at the visit today. He is   and has adult children.  \par \par His past medical history is right testicular discomfort and a nodule on his right testis he is noted for several months.  In his present occupation he has no known toxin exposure. He does not smoke and drinks socially .  He has no known drug allergies. His review of systems and past family and social history is otherwise noncontributory (see patient completed questionnaire). His family history is not significant. He has an AUA symptom score of 21. He has a sexual function symptom score of 20 (normal is 60-75).  \par patient has been injecting 0.35 cc of Trimix, he feels that the erection are strong  and he is satisfied.  He was interested in have retraining on how to inject, as he sometimes gets inadequate response to the injections.  He states that he often does not wear his glasses while injecting.  Patients PSA also increased this month to >9 from 6.5 and trace blood on urinalysis.  Dr Epperson is following and has ordered a bone scan and CT urogram.

## 2022-08-26 ENCOUNTER — APPOINTMENT (OUTPATIENT)
Dept: NUCLEAR MEDICINE | Facility: IMAGING CENTER | Age: 72
End: 2022-08-26

## 2023-02-23 NOTE — H&P PST ADULT - PRO TOBACCO QUIT READY
Quality 226: Preventive Care And Screening: Tobacco Use: Screening And Cessation Intervention: Tobacco Screening not Performed for Unknown Reasons Quality 111:Pneumonia Vaccination Status For Older Adults: Pneumococcal vaccine (PPSV23) administered on or after patient’s 60th birthday and before the end of the measurement period Quality 400a: One-Time Screening For Hepatitis C Virus (Hcv) For All Patients: Patient received one-time screening for HCV infection Quality 130: Documentation Of Current Medications In The Medical Record: Current Medications Documented Quality 110: Preventive Care And Screening: Influenza Immunization: Influenza Immunization Administered during Influenza season Quality 431: Preventive Care And Screening: Unhealthy Alcohol Use - Screening: Documentation of medical reason(s) for not screening for unhealthy alcohol use (e.g., limited life expectancy, other medical reasons) Quality 47: Advance Care Plan: Advance Care Planning discussed and documented in the medical record; patient did not wish or was not able to name a surrogate decision maker or provide an advance care plan. Detail Level: Detailed not motivated to quit

## 2023-11-13 NOTE — ASU DISCHARGE PLAN (ADULT/PEDIATRIC) - FOR NEXT 24 HOURS DO NOT:
START Trulicity 0.75 mg weekly. Patient is aware this is being used off label for weight loss. Medicaid only covers trulicity/victoza without a PA.  Trulicity Education:  - Counseled patient on Trulicity MOA, expectations, side effects, duration of therapy, administration, and monitoring parameters.  - Provided detailed dosing and administration counseling to ensure proper technique.   - Reviewed Trulicity titration schedule, starting with 0.75 mg once weekly to 1.5 mg, 3 mg, and if tolerated 4.5 mg.  - Counseled patient on the benefits of GLP-1ra, such as cardiovascular risk reduction, glycemic control, and weight loss potential.  - Reviewed storage requirements of Trulicity when not in use, and when to administer the medication if a dose is missed.  - Advised patient that they may experience improved satiety after meals and portion sizes of meals may be reduced as doses of Trulicity increase.   - Recommend patient reduce carb intake, and increase proteins and vegetables. She may feel better if she eats several small meals throughout the day vs 2-3 larger meals. Also try to avoid greasy, fatty, spicy or any other irritating foods especially when starting the medication.   Statement Selected

## 2024-01-16 NOTE — H&P PST ADULT - NS SC CAGE ALCOHOL CUT DOWN
[de-identified] : Fever [FreeTextEntry6] : ZE WINKLER is a 6 year old male presenting for complaints of low grade fever, tmax , complained of throat pain yesterday.  Mom wants him checked for strep.  Drinking well.  no

## 2024-03-11 ENCOUNTER — APPOINTMENT (OUTPATIENT)
Dept: SPINE | Facility: CLINIC | Age: 74
End: 2024-03-11
Payer: MEDICARE

## 2024-03-11 ENCOUNTER — NON-APPOINTMENT (OUTPATIENT)
Age: 74
End: 2024-03-11

## 2024-03-11 VITALS
OXYGEN SATURATION: 98 % | DIASTOLIC BLOOD PRESSURE: 78 MMHG | HEIGHT: 68 IN | SYSTOLIC BLOOD PRESSURE: 153 MMHG | WEIGHT: 195 LBS | BODY MASS INDEX: 29.55 KG/M2 | HEART RATE: 81 BPM

## 2024-03-11 PROCEDURE — 99204 OFFICE O/P NEW MOD 45 MIN: CPT

## 2024-03-11 RX ORDER — CLOPIDOGREL BISULFATE 75 MG/1
75 TABLET, FILM COATED ORAL
Refills: 0 | Status: ACTIVE | COMMUNITY

## 2024-03-18 LAB
ANION GAP SERPL CALC-SCNC: 14 MMOL/L
APTT BLD: 34 SEC
BUN SERPL-MCNC: 19 MG/DL
CALCIUM SERPL-MCNC: 10.6 MG/DL
CHLORIDE SERPL-SCNC: 103 MMOL/L
CO2 SERPL-SCNC: 25 MMOL/L
CREAT SERPL-MCNC: 1.21 MG/DL
EGFR: 62 ML/MIN/1.73M2
GLUCOSE SERPL-MCNC: 112 MG/DL
HCT VFR BLD CALC: 41.7 %
HGB BLD-MCNC: 13.3 G/DL
INR PPP: 0.96 RATIO
MCHC RBC-ENTMCNC: 26.6 PG
MCHC RBC-ENTMCNC: 31.9 GM/DL
MCV RBC AUTO: 83.4 FL
PLATELET # BLD AUTO: 353 K/UL
POTASSIUM SERPL-SCNC: 4.4 MMOL/L
PT BLD: 10.9 SEC
RBC # BLD: 5 M/UL
RBC # FLD: 16.6 %
SODIUM SERPL-SCNC: 142 MMOL/L
WBC # FLD AUTO: 10.21 K/UL

## 2024-03-26 ENCOUNTER — INPATIENT (INPATIENT)
Facility: HOSPITAL | Age: 74
LOS: 2 days | Discharge: HOME | DRG: 57 | End: 2024-03-29
Attending: NEUROLOGICAL SURGERY | Admitting: NEUROLOGICAL SURGERY
Payer: COMMERCIAL

## 2024-03-26 ENCOUNTER — APPOINTMENT (OUTPATIENT)
Dept: RADIOLOGY | Facility: HOSPITAL | Age: 74
End: 2024-03-26

## 2024-03-26 VITALS
TEMPERATURE: 98 F | DIASTOLIC BLOOD PRESSURE: 76 MMHG | RESPIRATION RATE: 18 BRPM | OXYGEN SATURATION: 97 % | SYSTOLIC BLOOD PRESSURE: 137 MMHG | HEART RATE: 70 BPM

## 2024-03-26 DIAGNOSIS — G91.2 (IDIOPATHIC) NORMAL PRESSURE HYDROCEPHALUS: ICD-10-CM

## 2024-03-26 DIAGNOSIS — Z98.49 CATARACT EXTRACTION STATUS, UNSPECIFIED EYE: Chronic | ICD-10-CM

## 2024-03-26 DIAGNOSIS — Z98.890 OTHER SPECIFIED POSTPROCEDURAL STATES: Chronic | ICD-10-CM

## 2024-03-26 LAB
ANION GAP SERPL CALC-SCNC: 11 MMOL/L — SIGNIFICANT CHANGE UP (ref 5–17)
APTT BLD: 31.5 SEC — SIGNIFICANT CHANGE UP (ref 24.5–35.6)
BLD GP AB SCN SERPL QL: NEGATIVE — SIGNIFICANT CHANGE UP
BUN SERPL-MCNC: 15 MG/DL — SIGNIFICANT CHANGE UP (ref 7–23)
CALCIUM SERPL-MCNC: 10.8 MG/DL — HIGH (ref 8.4–10.5)
CHLORIDE SERPL-SCNC: 105 MMOL/L — SIGNIFICANT CHANGE UP (ref 96–108)
CO2 SERPL-SCNC: 24 MMOL/L — SIGNIFICANT CHANGE UP (ref 22–31)
CREAT SERPL-MCNC: 1.15 MG/DL — SIGNIFICANT CHANGE UP (ref 0.5–1.3)
EGFR: 66 ML/MIN/1.73M2 — SIGNIFICANT CHANGE UP
GLUCOSE SERPL-MCNC: 97 MG/DL — SIGNIFICANT CHANGE UP (ref 70–99)
HCT VFR BLD CALC: 39.5 % — SIGNIFICANT CHANGE UP (ref 39–50)
HGB BLD-MCNC: 12.5 G/DL — LOW (ref 13–17)
INR BLD: 1.1 RATIO — SIGNIFICANT CHANGE UP (ref 0.85–1.18)
MCHC RBC-ENTMCNC: 26.3 PG — LOW (ref 27–34)
MCHC RBC-ENTMCNC: 31.6 GM/DL — LOW (ref 32–36)
MCV RBC AUTO: 83.2 FL — SIGNIFICANT CHANGE UP (ref 80–100)
NRBC # BLD: 0 /100 WBCS — SIGNIFICANT CHANGE UP (ref 0–0)
PLATELET # BLD AUTO: 332 K/UL — SIGNIFICANT CHANGE UP (ref 150–400)
POTASSIUM SERPL-MCNC: 4.6 MMOL/L — SIGNIFICANT CHANGE UP (ref 3.5–5.3)
POTASSIUM SERPL-SCNC: 4.6 MMOL/L — SIGNIFICANT CHANGE UP (ref 3.5–5.3)
PROTHROM AB SERPL-ACNC: 11.5 SEC — SIGNIFICANT CHANGE UP (ref 9.5–13)
RBC # BLD: 4.75 M/UL — SIGNIFICANT CHANGE UP (ref 4.2–5.8)
RBC # FLD: 15.8 % — HIGH (ref 10.3–14.5)
RH IG SCN BLD-IMP: POSITIVE — SIGNIFICANT CHANGE UP
SODIUM SERPL-SCNC: 140 MMOL/L — SIGNIFICANT CHANGE UP (ref 135–145)
WBC # BLD: 10.06 K/UL — SIGNIFICANT CHANGE UP (ref 3.8–10.5)
WBC # FLD AUTO: 10.06 K/UL — SIGNIFICANT CHANGE UP (ref 3.8–10.5)

## 2024-03-26 PROCEDURE — 62329 THER SPI PNXR CSF FLUOR/CT: CPT

## 2024-03-26 RX ORDER — TIMOLOL 0.5 %
1 DROPS OPHTHALMIC (EYE)
Refills: 0 | Status: DISCONTINUED | OUTPATIENT
Start: 2024-03-26 | End: 2024-03-29

## 2024-03-26 RX ORDER — SENNA PLUS 8.6 MG/1
2 TABLET ORAL AT BEDTIME
Refills: 0 | Status: DISCONTINUED | OUTPATIENT
Start: 2024-03-26 | End: 2024-03-29

## 2024-03-26 RX ORDER — AMLODIPINE BESYLATE 2.5 MG/1
10 TABLET ORAL DAILY
Refills: 0 | Status: DISCONTINUED | OUTPATIENT
Start: 2024-03-26 | End: 2024-03-26

## 2024-03-26 RX ORDER — POLYETHYLENE GLYCOL 3350 17 G/17G
17 POWDER, FOR SOLUTION ORAL DAILY
Refills: 0 | Status: DISCONTINUED | OUTPATIENT
Start: 2024-03-26 | End: 2024-03-29

## 2024-03-26 RX ORDER — ONDANSETRON 8 MG/1
4 TABLET, FILM COATED ORAL EVERY 6 HOURS
Refills: 0 | Status: DISCONTINUED | OUTPATIENT
Start: 2024-03-26 | End: 2024-03-29

## 2024-03-26 RX ORDER — BRIMONIDINE TARTRATE 2 MG/MG
1 SOLUTION/ DROPS OPHTHALMIC
Refills: 0 | Status: DISCONTINUED | OUTPATIENT
Start: 2024-03-26 | End: 2024-03-29

## 2024-03-26 RX ORDER — ACETAMINOPHEN 500 MG
1000 TABLET ORAL EVERY 6 HOURS
Refills: 0 | Status: DISCONTINUED | OUTPATIENT
Start: 2024-03-26 | End: 2024-03-28

## 2024-03-26 RX ORDER — AMLODIPINE BESYLATE 2.5 MG/1
5 TABLET ORAL DAILY
Refills: 0 | Status: DISCONTINUED | OUTPATIENT
Start: 2024-03-26 | End: 2024-03-29

## 2024-03-26 RX ADMIN — BRIMONIDINE TARTRATE 1 DROP(S): 2 SOLUTION/ DROPS OPHTHALMIC at 17:22

## 2024-03-26 RX ADMIN — Medication 1 TABLET(S): at 13:00

## 2024-03-26 RX ADMIN — SENNA PLUS 2 TABLET(S): 8.6 TABLET ORAL at 21:14

## 2024-03-26 RX ADMIN — Medication 1 DROP(S): at 17:22

## 2024-03-26 NOTE — H&P ADULT - HISTORY OF PRESENT ILLNESS
p (1480)     HPI: 73M h/o HTN, direct adm 4C under Dr. Mills for NPH LD trial, p/f gait imbalance x1-2yrs. PT saw, noted significant gait instability. Also endorses mild confusion and urinary incontinence. Outpatient MRI 2/7/24 w/ ventriculomegaly. Exam: AOx3, no drift, PERRL, EOMI, LIVINGSTON 5/5, SILT        Imaging:    Exam:    --Anticoagulation:    =====================  PAST MEDICAL HISTORY   HTN (hypertension)    HLD (hyperlipidemia)    Malignant neoplasm of prostate    Stroke Nov 2011    Fall against object 2009    Erectile dysfunction    Epidermal cyst    Cataract      PAST SURGICAL HISTORY   Prostate seed implant 2006    History of surgery    S/P cataract surgery      No Known Allergies      MEDICATIONS:  Antibiotics:    Neuro:  acetaminophen   IVPB .. 1000 milliGRAM(s) IV Intermittent every 6 hours PRN  ondansetron Injectable 4 milliGRAM(s) IV Push every 6 hours PRN    Other:  amLODIPine   Tablet 5 milliGRAM(s) Oral daily  multivitamin 1 Tablet(s) Oral daily  polyethylene glycol 3350 17 Gram(s) Oral daily  senna 2 Tablet(s) Oral at bedtime      SOCIAL HISTORY:   Occupation:   Marital Status:     FAMILY HISTORY:  No pertinent family history in first degree relatives        ROS: Negative except per HPI    LABS:  PT/INR - ( 26 Mar 2024 13:27 )   PT: 11.5 sec;   INR: 1.10 ratio         PTT - ( 26 Mar 2024 13:27 )  PTT:31.5 sec                        12.5   10.06 )-----------( 332      ( 26 Mar 2024 13:27 )             39.5

## 2024-03-26 NOTE — PHYSICAL THERAPY INITIAL EVALUATION ADULT - GAIT DEVIATIONS NOTED, PT EVAL
Pt ambulates with wide CHASTITY, shuffling gait, with increased distances dem occasional increased eddi with stumbling requiring CGA x1./decreased eddi/decreased step length/increased stride width

## 2024-03-26 NOTE — PHYSICAL THERAPY INITIAL EVALUATION ADULT - PERTINENT HX OF CURRENT PROBLEM, REHAB EVAL
72YO M with pmhx of Normal pressure hydrocephalus admitted for lumbar drain trial. 73M h/o HTN, direct admit 4C under Dr. Mills for NPH LD trial, p/f gait imbalance x1-2yrs. PT saw, noted significant gait instability. Also endorses mild confusion and urinary incontinence. Outpatient MRI 2/7/24 w/ ventriculomegaly. Exam: AOx3, no drift, PERRL, EOMI, LIVINGSTON 5/5, SILT.

## 2024-03-26 NOTE — PATIENT PROFILE ADULT - FALL HARM RISK - HARM RISK INTERVENTIONS

## 2024-03-26 NOTE — PHYSICAL THERAPY INITIAL EVALUATION ADULT - ADDITIONAL COMMENTS
Pt lives with spouse in house with 5 stairs to entrance with bilateral HR, flight of stairs to second level with unilateral HR. Pt with cane at home however only begin to use recently for long distances. Pt reports hx of falls within the past year and a half.

## 2024-03-26 NOTE — H&P ADULT - ASSESSMENT
73M h/o HTN, direct adm 4C under Dr. Mills for NPH LD trial, p/f gait imbalance x1-2yrs. PT saw, noted significant gait instability. Also endorses mild confusion and urinary incontinence. Outpatient MRI 2/7/24 w/ ventriculomegaly. Exam: AOx3, no drift, PERRL, EOMI, LIVINGSTON 5/5, SILT  -Adm 4C under Dr. Mills   -PT eval pre and post LD  -LD w/ neuro-rads today, 20cc drainage q4h

## 2024-03-26 NOTE — PHYSICAL THERAPY INITIAL EVALUATION ADULT - TIMED UP AND GO TEST, REHAB EVAL
Trial 1: 27.91 seconds,  8 step 180 degree turnTrial 2: 26.34 seconds, 7 step 180 degree turnTrial 3: 30.81 seconds, 9 step 180 degree turnTrial 4: 26.64 seconds, 6 step 180 degree turnAverage TU.93 seconds, with 7.5 step turn

## 2024-03-26 NOTE — OCCUPATIONAL THERAPY INITIAL EVALUATION ADULT - LIVES WITH, PROFILE
Pt lives in a house with his wife, 6 steps to enter, 13 steps inside, IND PTA using cane as needed. Baseline R eye glaucoma and L eye cataract. Wears reading glasses. Reports having a fall 2 months ago when doing lawn work outside.

## 2024-03-26 NOTE — PROGRESS NOTE ADULT - SUBJECTIVE AND OBJECTIVE BOX
CLINICAL INDICATION:  Trial of lumbar drain for Normal Pressure Hydrocephalus    PREPROCEDURE:    Patient presents for xray guided insertion of lumbar drain.  Risks and benefits were discussed with patient and/or health care proxy.  Risks include but are not limited to headache, bleeding, infection and nerve damage.    Patient and/or health care proxy understands and consents to procedure.    POSTPROCEDURE:    Lumbar puncture was performed at the L2-L3 level using a 14 gauge needle using fluoroscopic guidance. Clear csf flow was noted at hub of needle. Catheter was threaded into the subarachnoid space and was sutured into place by neurosurgery resident at bedside.     Patient tolerated the procedure well and left the department in stable condition.

## 2024-03-26 NOTE — PHYSICAL THERAPY INITIAL EVALUATION ADULT - PLANNED THERAPY INTERVENTIONS, PT EVAL
Goal: Patient will negotiate up and down 13 steps with unilateral rail independently, within 2 weeks./balance training/bed mobility training/gait training

## 2024-03-26 NOTE — OCCUPATIONAL THERAPY INITIAL EVALUATION ADULT - DIAGNOSIS, OT EVAL
Pt presents with impaired vision, decreased, balance, and coordination, all impacting ability to perform ADLs and functional mobility.

## 2024-03-27 LAB
HCV AB S/CO SERPL IA: 0.12 S/CO — SIGNIFICANT CHANGE UP (ref 0–0.99)
HCV AB SERPL-IMP: SIGNIFICANT CHANGE UP

## 2024-03-27 PROCEDURE — 99232 SBSQ HOSP IP/OBS MODERATE 35: CPT

## 2024-03-27 RX ORDER — ENOXAPARIN SODIUM 100 MG/ML
40 INJECTION SUBCUTANEOUS
Refills: 0 | Status: DISCONTINUED | OUTPATIENT
Start: 2024-03-27 | End: 2024-03-28

## 2024-03-27 RX ORDER — TRAMADOL HYDROCHLORIDE 50 MG/1
25 TABLET ORAL EVERY 6 HOURS
Refills: 0 | Status: DISCONTINUED | OUTPATIENT
Start: 2024-03-27 | End: 2024-03-29

## 2024-03-27 RX ADMIN — BRIMONIDINE TARTRATE 1 DROP(S): 2 SOLUTION/ DROPS OPHTHALMIC at 05:24

## 2024-03-27 RX ADMIN — ENOXAPARIN SODIUM 40 MILLIGRAM(S): 100 INJECTION SUBCUTANEOUS at 21:54

## 2024-03-27 RX ADMIN — Medication 400 MILLIGRAM(S): at 00:20

## 2024-03-27 RX ADMIN — Medication 1 TABLET(S): at 11:14

## 2024-03-27 RX ADMIN — Medication 1 DROP(S): at 05:24

## 2024-03-27 RX ADMIN — SENNA PLUS 2 TABLET(S): 8.6 TABLET ORAL at 21:53

## 2024-03-27 RX ADMIN — AMLODIPINE BESYLATE 5 MILLIGRAM(S): 2.5 TABLET ORAL at 05:24

## 2024-03-27 NOTE — CONSULT NOTE ADULT - SUBJECTIVE AND OBJECTIVE BOX
C A R D I O L O G Y  *********************    DATE OF SERVICE: 03-27-24    HISTORY OF PRESENT ILLNESS: HPI:  Patient is a 74 y/o male well known to our office with PMH of HTN, HLD, TIA, and PAD s/p RLE angioplasty who is admitted for NPH Lumbar Drain trial. He has had complaints of unsteady gait. Denies chest pain, SOB, palpitations, dizziness, or syncope.      PAST MEDICAL & SURGICAL HISTORY:  HTN (hypertension)      HLD (hyperlipidemia)      Malignant neoplasm of prostate      Stroke Nov 2011  Denies residual , No neurologist      Fall against object 2009      Erectile dysfunction      Epidermal cyst      Cataract      Prostate seed implant 2006      History of surgery  Cryosurgical ablation of prostate with flexible cystoscopy on 02/03/2012      S/P cataract surgery  Right eye              MEDICATIONS:  MEDICATIONS  (STANDING):  amLODIPine   Tablet 5 milliGRAM(s) Oral daily  brimonidine 0.2% Ophthalmic Solution 1 Drop(s) Both EYES two times a day  enoxaparin Injectable 40 milliGRAM(s) SubCutaneous <User Schedule>  multivitamin 1 Tablet(s) Oral daily  polyethylene glycol 3350 17 Gram(s) Oral daily  senna 2 Tablet(s) Oral at bedtime  timolol 0.5% Solution 1 Drop(s) Both EYES two times a day      Allergies    No Known Allergies    Intolerances        FAMILY HISTORY:  No pertinent family history in first degree relatives      Non-contributary for premature coronary disease or sudden cardiac death    SOCIAL HISTORY:    [x ] Non-smoker  [ ] Smoker  [ ] Alcohol    FLU VACCINE THIS YEAR STARTS IN AUGUST:  [ ] Yes    [ ] No    IF OVER 65 HAVE YOU EVER HAD A PNA VACCINE:  [ ] Yes    [ ] No       [ ] N/A      REVIEW OF SYSTEMS:  [ ]chest pain  [  ]shortness of breath  [  ]palpitations  [  ]syncope  [ ]near syncope [ ]upper extremity weakness   [ ] lower extremity weakness  [  ]diplopia  [  ]altered mental status   [  ]fevers  [ ]chills [ ]nausea  [ ]vomiting  [  ]dysphagia    [ ]abdominal pain  [ ]melena  [ ]BRBPR    [  ]epistaxis  [  ]rash    [ ]lower extremity edema    +unsteady gait    [X] All others negative	  [ ] Unable to obtain      LABS:	 	    CARDIAC MARKERS:                              12.5   10.06 )-----------( 332      ( 26 Mar 2024 13:27 )             39.5     Hb Trend: 12.5<--    03-26    140  |  105  |  15  ----------------------------<  97  4.6   |  24  |  1.15    Ca    10.8<H>      26 Mar 2024 13:27      Creatinine Trend: 1.15<--    Coags:      proBNP:   Lipid Profile:   HgA1c:   TSH:         PHYSICAL EXAM:  T(C): 37.1 (03-27-24 @ 12:50), Max: 37.1 (03-26-24 @ 20:15)  HR: 66 (03-27-24 @ 12:50) (60 - 70)  BP: 126/70 (03-27-24 @ 12:50) (122/71 - 184/82)  RR: 18 (03-27-24 @ 12:50) (18 - 19)  SpO2: 98% (03-27-24 @ 12:50) (95% - 98%)  Wt(kg): --   BMI (kg/m2): 29.4 (03-27-24 @ 12:59)  I&O's Summary    26 Mar 2024 07:01  -  27 Mar 2024 07:00  --------------------------------------------------------  IN: 600 mL / OUT: 960 mL / NET: -360 mL        Gen: NAD  HEENT:  (-)icterus (-)pallor  CV: N S1 S2 1/6 DAVIAN (+)2 Pulses B/l  Resp:  Clear to auscultation B/L, normal effort  GI: (+) BS Soft, NT, ND  Lymph:  (-)Edema, (-)obvious lymphadenopathy  Skin: Warm to touch, Normal turgor  Psych: Appropriate mood and affect      TELEMETRY: None	    ECG: Pending 	    ASSESSMENT/PLAN: Patient is a 74 y/o male well known to our office with PMH of HTN, HLD, TIA, and PAD s/p RLE angioplasty who is admitted for NPH Lumbar Drain trial.     #NPH  - s/p lumbar drain  - Management per neurosurgery    #HTN  - Continue Amlodipine 5mg daily  - Patient had office TTE 10/2023 with normal LV/RV function and office pharm NST with no ischemia in 2/2022    - No repeat cardiac testing planned at this time  - Will follow with you, please reach out if any questions/concerns  - Patient has f/u with Dr. Joiner on 5/10 at 10:55 AM    Oziel Arredondo PA-C  Pager: [phone]   C A R D I O L O G Y  *********************    DATE OF SERVICE: 03-27-24    HISTORY OF PRESENT ILLNESS: HPI:  Patient is a 72 y/o male well known to our office with PMH of HTN, HLD, TIA, and PAD s/p RLE angioplasty who is admitted for NPH Lumbar Drain trial. He has had complaints of unsteady gait. Denies chest pain, SOB, palpitations, dizziness, or syncope.      PAST MEDICAL & SURGICAL HISTORY:  HTN (hypertension)      HLD (hyperlipidemia)      Malignant neoplasm of prostate      Stroke Nov 2011  Denies residual , No neurologist      Fall against object 2009      Erectile dysfunction      Epidermal cyst      Cataract      Prostate seed implant 2006      History of surgery  Cryosurgical ablation of prostate with flexible cystoscopy on 02/03/2012      S/P cataract surgery  Right eye              MEDICATIONS:  MEDICATIONS  (STANDING):  amLODIPine   Tablet 5 milliGRAM(s) Oral daily  brimonidine 0.2% Ophthalmic Solution 1 Drop(s) Both EYES two times a day  enoxaparin Injectable 40 milliGRAM(s) SubCutaneous <User Schedule>  multivitamin 1 Tablet(s) Oral daily  polyethylene glycol 3350 17 Gram(s) Oral daily  senna 2 Tablet(s) Oral at bedtime  timolol 0.5% Solution 1 Drop(s) Both EYES two times a day      Allergies    No Known Allergies    Intolerances        FAMILY HISTORY:  No pertinent family history in first degree relatives      Non-contributary for premature coronary disease or sudden cardiac death    SOCIAL HISTORY:    [x ] Non-smoker  [ ] Smoker  [ ] Alcohol    FLU VACCINE THIS YEAR STARTS IN AUGUST:  [ ] Yes    [ ] No    IF OVER 65 HAVE YOU EVER HAD A PNA VACCINE:  [ ] Yes    [ ] No       [ ] N/A      REVIEW OF SYSTEMS:  [ ]chest pain  [  ]shortness of breath  [  ]palpitations  [  ]syncope  [ ]near syncope [ ]upper extremity weakness   [ ] lower extremity weakness  [  ]diplopia  [  ]altered mental status   [  ]fevers  [ ]chills [ ]nausea  [ ]vomiting  [  ]dysphagia    [ ]abdominal pain  [ ]melena  [ ]BRBPR    [  ]epistaxis  [  ]rash    [ ]lower extremity edema    +unsteady gait    [X] All others negative	  [ ] Unable to obtain      LABS:	 	    CARDIAC MARKERS:                              12.5   10.06 )-----------( 332      ( 26 Mar 2024 13:27 )             39.5     Hb Trend: 12.5<--    03-26    140  |  105  |  15  ----------------------------<  97  4.6   |  24  |  1.15    Ca    10.8<H>      26 Mar 2024 13:27      Creatinine Trend: 1.15<--    Coags:      proBNP:   Lipid Profile:   HgA1c:   TSH:         PHYSICAL EXAM:  T(C): 37.1 (03-27-24 @ 12:50), Max: 37.1 (03-26-24 @ 20:15)  HR: 66 (03-27-24 @ 12:50) (60 - 70)  BP: 126/70 (03-27-24 @ 12:50) (122/71 - 184/82)  RR: 18 (03-27-24 @ 12:50) (18 - 19)  SpO2: 98% (03-27-24 @ 12:50) (95% - 98%)  Wt(kg): --   BMI (kg/m2): 29.4 (03-27-24 @ 12:59)  I&O's Summary    26 Mar 2024 07:01  -  27 Mar 2024 07:00  --------------------------------------------------------  IN: 600 mL / OUT: 960 mL / NET: -360 mL        Gen: NAD  HEENT:  (-)icterus (-)pallor  CV: N S1 S2 1/6 DAVIAN (+)2 Pulses B/l  Resp:  Clear to auscultation B/L, normal effort  GI: (+) BS Soft, NT, ND  Lymph:  (-)Edema, (-)obvious lymphadenopathy  Skin: Warm to touch, Normal turgor  Psych: Appropriate mood and affect      TELEMETRY: None	    ECG: Pending 	    ASSESSMENT/PLAN: Patient is a 72 y/o male well known to our office with PMH of HTN, HLD, TIA, and PAD s/p RLE angioplasty who is admitted for NPH Lumbar Drain trial.     #NPH  - s/p lumbar drain  - Management per neurosurgery    #HTN  - Continue Amlodipine 5mg daily  - Patient had office TTE 10/2023 with normal LV/RV function and office pharm NST with no ischemia in 2/2022    #PAD  - Restart antiplatelet therapy and statin when ok with neurosx    - No repeat cardiac testing planned at this time  - Will follow with you, please reach out if any questions/concerns  - Patient has f/u with Dr. Joiner on 5/10 at 10:55 AM    Oziel Arredondo PA-C  Pager: [phone]

## 2024-03-27 NOTE — CONSULT NOTE ADULT - ASSESSMENT
Patient seen and examined, agree with above assessment and plan as transcribed above.    - PAD s/p recent intercention  - resume ASA and statin when ok with NSX    Driss Chau MD, [facility]  BEEPER [phone]

## 2024-03-27 NOTE — PROGRESS NOTE ADULT - ASSESSMENT
ASSESSMENT AND PLAN: 73M h/o HTN, direct adm 4C under Dr. Mills for NPH LD trial, p/f gait imbalance x1-2yrs. PT saw, noted significant gait instability. Also endorses mild confusion and urinary incontinence. Outpatient MRI 2/7/24 w/ ventriculomegaly. Exam: AOx3, no drift, PERRL, EOMI, [place]IV[place] 5/5, SILT    s/p lumbar drain placement by Neuroradiology for NPH trial 3/26/24    NEURO:   - Continue neuro checks q 4  - Lumbar drain 20cc every 4 hours  - Daily PT evaluation for the trial  - Pain control w/ Tylenol prn and Tramadol prn  - PT/OT: Home PT/OT    PULM:   - On room air, O2Sat>95%  - Incentive spirometry    CV:  - -160  - Continue Norvasc for HTN    ENDO:   - Goal euglycemia    HEME/ONC:             3/26 CBC stable         DVT ppx: SCDs, SQL    RENAL:   - IVL  - 3/26 BMP stable    ID:   - Afebrile    GI:    - Continue oral diet  - Senna, Miralax for bowel regimen; last BM 3/27  - MVT    MISC:  - Alphagan and Timolol eye drops    More than 30 minutes were spent educating the patient regarding condition, medications, follow up plans, signs and symptoms to be concerned with, preparing paperwork, and questions answered regarding discharge.     DISCHARGE PLANNING:   Home PT/OT once lumbar drain is out    Plan to be discussed w/ Dr. Mills  [phone]

## 2024-03-27 NOTE — CHART NOTE - NSCHARTNOTEFT_GEN_A_CORE
CAPRINI SCORE [CLOT] Score on Admission for     AGE RELATED RISK FACTORS                                                       MOBILITY RELATED FACTORS  [ ] Age 41-60 years                                            (1 Point)                  [ ] Bed rest                                                        (1 Point)  [ ] Age: 61-74 years                                           (2 Points)                 [ ] Plaster cast                                                   (2 Points)  [ ] Age= 75 years                                              (3 Points)                 [ ] Bed bound for more than 72 hours                 (2 Points)    DISEASE RELATED RISK FACTORS                                               GENDER SPECIFIC FACTORS  [ ] Edema in the lower extremities                       (1 Point)                  [ ] Pregnancy                                                     (1 Point)  [ ] Varicose veins                                               (1 Point)                  [ ] Post-partum < 6 weeks                                   (1 Point)             [ x] BMI > 25 Kg/m2                                            (1 Point)                  [ ] Hormonal therapy  or oral contraception          (1 Point)                 [ ] Sepsis (in the previous month)                        (1 Point)                  [ ] History of pregnancy complications                 (1 point)  [ ] Pneumonia or serious lung disease                                               [ ] Unexplained or recurrent                     (1 Point)           (in the previous month)                               (1 Point)  [ ] Abnormal pulmonary function test                     (1 Point)                 SURGERY RELATED RISK FACTORS (include planned surgeries)  [ ] Acute myocardial infarction                              (1 Point)                 [ ]  Section                                             (1 Point)  [ ] Congestive heart failure (in the previous month)  (1 Point)               [ ] Minor surgery                                                  (1 Point)   [ ] Inflammatory bowel disease                             (1 Point)                 [ ] Arthroscopic surgery                                        (2 Points)  [ ] Central venous access                                      (2 Points)                [ ] General surgery lasting more than 45 minutes   (2 Points)       [ ] Stroke (in the previous month)                          (5 Points)               [ ] Elective arthroplasty                                         (5 Points)            [ ] Current or past malignancy                                (2 Points)                                                                                                     HEMATOLOGY RELATED FACTORS                                                 TRAUMA RELATED RISK FACTORS  [ ] Prior episodes of VTE                                     (3 Points)                [ ] Fracture of the hip, pelvis, or leg                       (5 Points)  [ ] Positive family history for VTE                         (3 Points)                 [ ] Acute spinal cord injury (in the previous month)  (5 Points)  [ ] Prothrombin 85855 A                                     (3 Points)                 [ ] Paralysis  (less than 1 month)                             (5 Points)  [ ] Factor V Leiden                                             (3 Points)                  [ ] Multiple Trauma within 1 month                        (5 Points)  [ ] Lupus anticoagulants                                     (3 Points)                                                           [ ] Anticardiolipin antibodies                               (3 Points)                                                       [ ] High homocysteine in the blood                      (3 Points)                                             [ ] Other congenital or acquired thrombophilia      (3 Points)                                                [ ] Heparin induced thrombocytopenia                  (3 Points)                                          Total Score [   1       ]    Risk:  Very low 0   Low 1 to 2   Moderate 3 to 4   High =5       VTE Prophylasix Recommednations:  [x ] mechanical pneumatic compression devices                                      [ ] contraindicated: _____________________  [ ] chemo prophylasix                                                                                   [ ] contraindicated _____________________    **** HIGH LIKELIHOOD DVT PRESENT ON ADMISSION  [ ] (please order LE dopplers within 24 hours of admission)

## 2024-03-27 NOTE — PROGRESS NOTE ADULT - SUBJECTIVE AND OBJECTIVE BOX
SUBJECTIVE: HPI:  73M h/o HTN, direct adm 4C under Dr. Mills for NPH LD trial, p/f gait imbalance x1-2yrs. PT saw, noted significant gait instability. Also endorses mild confusion and urinary incontinence. Outpatient MRI 2/7/24 w/ ventriculomegaly. Exam: AOx3, no drift, PERRL, EOMI, LIV[place] 5/5, SILT    OVERNIGHT EVENTS: No acute events overnight, patient seen and evaluated this morning, doing well c/o of mild back pain but no headaches while being drained.     Vital Signs Last 24 Hrs  T(C): 36.8 (27 Mar 2024 12:12), Max: 37.1 (26 Mar 2024 13:00)  T(F): 98.3 (27 Mar 2024 12:12), Max: 98.8 (27 Mar 2024 04:01)  HR: 64 (27 Mar 2024 12:27) (60 - 71)  BP: 134/68 (27 Mar 2024 12:27) (122/71 - 184/82)  BP(mean): --  RR: 18 (27 Mar 2024 12:27) (18 - 19)  SpO2: 97% (27 Mar 2024 12:27) (95% - 98%)    Parameters below as of 27 Mar 2024 12:27  Patient On (Oxygen Delivery Method): room air        PHYSICAL EXAM:    General: No Acute Distress     Neurological: Awake, Ox3 (name, place, date), PERRL EOMI, following commands, no drift, uppers 5/5, lowers 5/5, SILT    Pulmonary: Clear to Auscultation, No Rales, No Rhonchi, No Wheezes     Cardiovascular: S1, S2, Regular Rate and Rhythm     Gastrointestinal: Soft, Nontender, Nondistended     Incision: lumbar drain dressing has gauze and tegaderm, reinforced with more tegaderm, o/w is clean, dry and intact    LABS:                        12.5   10.06 )-----------( 332      ( 26 Mar 2024 13:27 )             39.5    03-26    140  |  105  |  15  ----------------------------<  97  4.6   |  24  |  1.15    Ca    10.8<H>      26 Mar 2024 13:27    PT/INR - ( 26 Mar 2024 13:27 )   PT: 11.5 sec;   INR: 1.10 ratio         PTT - ( 26 Mar 2024 13:27 )  PTT:31.5 sec      03-26 @ 07:01  -  03-27 @ 07:00  --------------------------------------------------------  IN: 600 mL / OUT: 960 mL / NET: -360 mL      DRAINS: lumbar drain 20cc every 4 hours    MEDICATIONS:  Antibiotics:    Neuro:  acetaminophen   IVPB .. 1000 milliGRAM(s) IV Intermittent every 6 hours PRN Mild Pain (1 - 3)  ondansetron Injectable 4 milliGRAM(s) IV Push every 6 hours PRN Nausea and/or Vomiting  traMADol 25 milliGRAM(s) Oral every 6 hours PRN Moderate Pain (4 - 6)    Cardiac:  amLODIPine   Tablet 5 milliGRAM(s) Oral daily    Pulm:    GI/:  polyethylene glycol 3350 17 Gram(s) Oral daily  senna 2 Tablet(s) Oral at bedtime    Other:   brimonidine 0.2% Ophthalmic Solution 1 Drop(s) Both EYES two times a day  enoxaparin Injectable 40 milliGRAM(s) SubCutaneous <User Schedule>  multivitamin 1 Tablet(s) Oral daily  timolol 0.5% Solution 1 Drop(s) Both EYES two times a day    DIET: [x] Regular [] CCD [] Renal [] Puree [] Dysphagia [] Tube Feeds:     IMAGING:   < from: Xray Lumbar Puncture, Theraputic (03.26.24 @ 15:50) >  INTERPRETATION:  Fluoroscopically guided placement of lumbar drain    CLINICAL INDICATION: Trial for  shunt, suspected normalpressure   hydrocephalus    COMPARISON STUDIES:  MRI lumbar spine 2/7/2024    FLUOROSCOPY TIME:  6 seconds    Operators: Dr. Cb Roque, nurse practitioner Indy Loaiza    TECHNIQUE: The patient was informed of the risks, benefits, and   alternatives of the procedure and gave willing consent. The patient was   placed prone on the fluoroscopy table. The lower back was prepped and   draped in usual sterile fashion. Under fluoroscopic guidance the L2-L3   interspace was localized. 1% Lidocaine anesthetic was administered   subcutaneously in this region. Under intermittent fluoroscopic guidance a   14-gauge Touhy needle was advanced until its tip was within the thecal   sac at the L2-L3 level. Lumbar drain was placed through the needle   approximately 20 cm. The 14-gauge Touhy needle was then moved over the   drain. The drain was sewn in place by members of the anesthesia   department who were present. The patient tolerated the procedure well and   without complication.    IMPRESSION:    Successful fluoroscopically guided placement of lumbar drain at L2-L3 as   described.    --- End of Report ---    CB ROQUE MD; Attending Radiologist  This document has been electronically signed. Mar 26 2024  3:53PM    < end of copied text >

## 2024-03-28 ENCOUNTER — TRANSCRIPTION ENCOUNTER (OUTPATIENT)
Age: 74
End: 2024-03-28

## 2024-03-28 PROCEDURE — 99232 SBSQ HOSP IP/OBS MODERATE 35: CPT

## 2024-03-28 RX ORDER — ATORVASTATIN CALCIUM 80 MG/1
40 TABLET, FILM COATED ORAL AT BEDTIME
Refills: 0 | Status: DISCONTINUED | OUTPATIENT
Start: 2024-03-28 | End: 2024-03-29

## 2024-03-28 RX ORDER — ACETAMINOPHEN 500 MG
650 TABLET ORAL EVERY 6 HOURS
Refills: 0 | Status: DISCONTINUED | OUTPATIENT
Start: 2024-03-28 | End: 2024-03-29

## 2024-03-28 RX ORDER — ASPIRIN/CALCIUM CARB/MAGNESIUM 324 MG
81 TABLET ORAL DAILY
Refills: 0 | Status: DISCONTINUED | OUTPATIENT
Start: 2024-03-29 | End: 2024-03-29

## 2024-03-28 RX ADMIN — Medication 1 TABLET(S): at 12:36

## 2024-03-28 RX ADMIN — Medication 650 MILLIGRAM(S): at 13:04

## 2024-03-28 RX ADMIN — AMLODIPINE BESYLATE 5 MILLIGRAM(S): 2.5 TABLET ORAL at 06:05

## 2024-03-28 RX ADMIN — ATORVASTATIN CALCIUM 40 MILLIGRAM(S): 80 TABLET, FILM COATED ORAL at 21:13

## 2024-03-28 RX ADMIN — Medication 650 MILLIGRAM(S): at 12:34

## 2024-03-28 NOTE — PROGRESS NOTE ADULT - SUBJECTIVE AND OBJECTIVE BOX
SUBJECTIVE: HPI:  73M h/o HTN, direct adm 4C under Dr. Mills for NPH LD trial, p/f gait imbalance x1-2yrs. PT saw, noted significant gait instability. Also endorses mild confusion and urinary incontinence. Outpatient MRI 2/7/24 w/ ventriculomegaly. Exam: AOx3, no drift, PERRL, EOMI, [place]IV[place] 5/5, SILT      OVERNIGHT EVENTS: No acute events overnight, patient seen and evaluated this morning. Reports no headaches and tolerating drainage, overnight RN was only able to drain 18-19cc at each time, this morning lumbar drain working well. Patient states his back pain is improving.     Vital Signs Last 24 Hrs  T(C): 36.7 (28 Mar 2024 08:40), Max: 37.1 (27 Mar 2024 12:50)  T(F): 98 (28 Mar 2024 08:40), Max: 98.8 (28 Mar 2024 04:00)  HR: 72 (28 Mar 2024 08:40) (60 - 75)  BP: 123/69 (28 Mar 2024 08:40) (120/67 - 158/76)  BP(mean): 85 (28 Mar 2024 04:50) (85 - 85)  RR: 18 (28 Mar 2024 08:40) (18 - 20)  SpO2: 95% (28 Mar 2024 08:40) (94% - 98%)    Parameters below as of 28 Mar 2024 08:40  Patient On (Oxygen Delivery Method): room air        PHYSICAL EXAM:    General: No Acute Distress     Neurological: Awake, Ox3 (name, place, date), EOMI, PERRL, following commands, no drift, uppers 5/5, lowers 5/5, SILT    Pulmonary: Clear to Auscultation, No Rales, No Rhonchi, No Wheezes     Cardiovascular: S1, S2, Regular Rate and Rhythm     Gastrointestinal: Soft, Nontender, Nondistended     Incision: lumbar drain dressing has gauze w/ tegaderm in place    LABS:                        12.5   10.06 )-----------( 332      ( 26 Mar 2024 13:27 )             39.5    03-26    140  |  105  |  15  ----------------------------<  97  4.6   |  24  |  1.15    Ca    10.8<H>      26 Mar 2024 13:27    PT/INR - ( 26 Mar 2024 13:27 )   PT: 11.5 sec;   INR: 1.10 ratio         PTT - ( 26 Mar 2024 13:27 )  PTT:31.5 sec      03-27 @ 07:01  -  03-28 @ 07:00  --------------------------------------------------------  IN: 120 mL / OUT: 113 mL / NET: 7 mL    03-28 @ 07:01  -  03-28 @ 08:55  --------------------------------------------------------  IN: 0 mL / OUT: 19 mL / NET: -19 mL      DRAINS: Lumbar drain in place 18-20cc/4hrs    MEDICATIONS:  Antibiotics:    Neuro:  acetaminophen     Tablet .. 650 milliGRAM(s) Oral every 6 hours PRN Temp greater or equal to 38C (100.4F), Mild Pain (1 - 3)  ondansetron Injectable 4 milliGRAM(s) IV Push every 6 hours PRN Nausea and/or Vomiting  traMADol 25 milliGRAM(s) Oral every 6 hours PRN Moderate Pain (4 - 6)    Cardiac:  amLODIPine   Tablet 5 milliGRAM(s) Oral daily    Pulm:    GI/:  polyethylene glycol 3350 17 Gram(s) Oral daily  senna 2 Tablet(s) Oral at bedtime    Other:   brimonidine 0.2% Ophthalmic Solution 1 Drop(s) Both EYES two times a day  multivitamin 1 Tablet(s) Oral daily  timolol 0.5% Solution 1 Drop(s) Both EYES two times a day    DIET: [x] Regular [] CCD [] Renal [] Puree [] Dysphagia [] Tube Feeds:     IMAGING:   < from: Xray Lumbar Puncture, Theraputic (03.26.24 @ 15:50) >  IMPRESSION:    Successful fluoroscopically guided placement of lumbar drain at L2-L3 as   described.    --- End of Report ---    TONE COLLAZO MD; Attending Radiologist  This document has been electronically signed. Mar 26 2024  3:53PM    < end of copied text >

## 2024-03-28 NOTE — PROGRESS NOTE ADULT - SUBJECTIVE AND OBJECTIVE BOX
C A R D I O L O G Y  **********************************     DATE OF SERVICE: 03-28-24    Patient denies chest pain or shortness of breath.   Review of systems otherwise negative.  	  MEDICATIONS:  MEDICATIONS  (STANDING):  amLODIPine   Tablet 5 milliGRAM(s) Oral daily  atorvastatin 40 milliGRAM(s) Oral at bedtime  brimonidine 0.2% Ophthalmic Solution 1 Drop(s) Both EYES two times a day  multivitamin 1 Tablet(s) Oral daily  polyethylene glycol 3350 17 Gram(s) Oral daily  senna 2 Tablet(s) Oral at bedtime  timolol 0.5% Solution 1 Drop(s) Both EYES two times a day      LABS:	 	    CARDIAC MARKERS:                                12.5   10.06 )-----------( 332      ( 26 Mar 2024 13:27 )             39.5     Hemoglobin: 12.5 g/dL (03-26 @ 13:27)      03-26    140  |  105  |  15  ----------------------------<  97  4.6   |  24  |  1.15    Ca    10.8<H>      26 Mar 2024 13:27      Creatinine Trend: 1.15<--    COAGS:       proBNP:   Lipid Profile:   HgA1c:   TSH:       PHYSICAL EXAM:  T(C): 37 (03-28-24 @ 12:10), Max: 37.1 (03-27-24 @ 12:50)  HR: 68 (03-28-24 @ 12:25) (58 - 75)  BP: 123/66 (03-28-24 @ 12:25) (110/59 - 158/76)  RR: 18 (03-28-24 @ 12:25) (18 - 20)  SpO2: 95% (03-28-24 @ 12:25) (94% - 98%)  Wt(kg): --  I&O's Summary    27 Mar 2024 07:01  -  28 Mar 2024 07:00  --------------------------------------------------------  IN: 120 mL / OUT: 113 mL / NET: 7 mL    28 Mar 2024 07:01  -  28 Mar 2024 12:46  --------------------------------------------------------  IN: 0 mL / OUT: 19 mL / NET: -19 mL      Height (cm): 172.7 (03-27 @ 12:59)  Weight (kg): 87.6 (03-27 @ 12:59)  BMI (kg/m2): 29.4 (03-27 @ 12:59)  BSA (m2): 2.01 (03-27 @ 12:59)    Gen: NAD  HEENT:  (-)icterus (-)pallor  CV: N S1 S2 1/6 DAVIAN (+)2 Pulses B/l  Resp:  Clear to auscultation B/L, normal effort  GI: (+) BS Soft, NT, ND  Lymph:  (-)Edema, (-)obvious lymphadenopathy  Skin: Warm to touch, Normal turgor  Psych: Appropriate mood and affect      TELEMETRY: None	      ASSESSMENT/PLAN: Patient is a 74 y/o male well known to our office with PMH of HTN, HLD, TIA, and PAD s/p RLE angioplasty who is admitted for NPH Lumbar Drain trial.     #NPH  - s/p lumbar drain  - Management per neurosurgery    #HTN  - Continue Amlodipine 5mg daily  - Patient had office TTE 10/2023 with normal LV/RV function and office pharm NST with no ischemia in 2/2022    #PAD  - Continue Lipitor  - Restart ASA 81mg daily when ok with neurosurgery    - No repeat cardiac testing planned at this time  - Will follow with you, please reach out if any questions/concerns  - Patient has f/u with Dr. Joiner on 5/10 at 10:55 AM    Oziel Arredondo PA-C  Pager: [phone]

## 2024-03-28 NOTE — PROGRESS NOTE ADULT - ASSESSMENT
Agree with above assessment and plan as outlined above.    - No need for further inpatient cardiac work up.  - d/c planning per neuro    Driss Chau MD, [facility]  BEEPER [phone]

## 2024-03-28 NOTE — DISCHARGE NOTE PROVIDER - NSDCFUADDINST_GEN_ALL_CORE_FT
Please make all necessary appointments and follow up. Please DO NOT take any NSAIDs (Advil, Aleve, Motrin, Ibuprofen) until cleared by your Neurosurgeon. Please DO NOT do any heavy lifting, bending, twisting and straining. You have a surgical stitch where your lumbar drain was removed, you may shower however please no soaking in the bath and do not scrub at incision site, pat dry only. Please come to the emergency room for any of the following: altered mental status, seizures, pain uncontrolled by pain medications, fevers, leaking / bleeding from surgical site, chest pain and shortness of breath.

## 2024-03-28 NOTE — DISCHARGE NOTE PROVIDER - CARE PROVIDER_API CALL
Marc Mills)  Neurosurgery  [address], [address]  [place] [address]  Phone: [phone]  Fax: [phone]  Follow Up Time:     Kevin Joiner  Interventional Cardiology  [address], [address]  [place] [address]  Phone: [phone]  Fax: [phone]  Follow Up Time:

## 2024-03-28 NOTE — DISCHARGE NOTE PROVIDER - HOSPITAL COURSE
73M h/o HTN, direct adm 4C under Dr. Mills for NPH LD trial, p/f gait imbalance x1-2yrs. PT saw, noted significant gait instability. Also endorses mild confusion and urinary incontinence. Outpatient MRI 2/7/24 w/ ventriculomegaly. Exam: AOx3, no drift, PERRL, EOMI, LIVINGSTON 5/5, SILT 73M h/o HTN, direct adm 4C under Dr. Mills for NPH LD trial, p/f gait imbalance x1-2yrs. PT saw, noted significant gait instability. Also endorses mild confusion and urinary incontinence. Outpatient MRI 2/7/24 w/ ventriculomegaly. Exam: AOx3, no drift, PERRL, EOMI, LIVINGSTON 5/5, SILT  Patient had a lumbar drain placed on 3/26 and he has been followed by PT. [place] there has been some improvement. He is getting a stereo brain Ct and he is being discharged home . He is to f/u with Dr Mills for possible VPS 73M h/o HTN, direct adm 4C under Dr. Mills for NPH LD trial, p/f gait imbalance x1-2yrs. PT saw, noted significant gait instability. Also endorses mild confusion and urinary incontinence. Outpatient MRI 2/7/24 w/ ventriculomegaly. Exam: AOx3, no drift, PERRL, EOMI, LIVINGSTON 5/5, SILT  Patient had a lumbar drain placed on 3/26 and he has been followed by PT. [place] there has been some improvement. He is getting a stereo brain Ct and he is being discharged home . He is to f/u with Dr Mills for possible VPS  He is stable for discharge home with out patient PT

## 2024-03-28 NOTE — DISCHARGE NOTE PROVIDER - NSDCCPCAREPLAN_GEN_ALL_CORE_FT
PRINCIPAL DISCHARGE DIAGNOSIS  Diagnosis: NPH (normal pressure hydrocephalus)  Assessment and Plan of Treatment: s/p lumbar drain placement by Neuroradiology for NPH trial 3/26/24  Please make an appointment and follow up with Dr. Mills in 1-2 weeks after discharge. You have a surgical suture in place where they removed your lumbar drain, it is absorbable. You may shower however please DO NOT SOAK IN BATHTUB. Please do NOT scrub at incision site, pat dry only. You may take Tylenol (Acetaminophen) as needed for pain control. Please do NOT take any NSAIDs (Advil, Aleve, Motrin, Ibuprofen) as these medications can increase your risk of bleeding after a procedure.      SECONDARY DISCHARGE DIAGNOSES  Diagnosis: Hypertension  Assessment and Plan of Treatment: Please continue your Amlodipine as prescribed. Please follow up with Dr. Joiner your cardiologist.

## 2024-03-28 NOTE — DISCHARGE NOTE PROVIDER - NSDCMRMEDTOKEN_GEN_ALL_CORE_FT
acetaminophen 325 mg oral tablet: 2 tab(s) orally every 6 hours, As Needed for mild pain  amLODIPine 5 mg oral tablet: 1 tab(s) orally once a day  ibuprofen 400 mg oral tablet: 2 tab(s) orally every 6 hours, As Needed for moderate pain  Multiple Vitamins oral capsule: 1 cap(s) orally once a day last dose per patient 4/21/2019   acetaminophen 325 mg oral tablet: 2 tab(s) orally every 6 hours, As Needed for mild pain  amLODIPine 5 mg oral tablet: 1 tab(s) orally once a day  aspirin 81 mg oral tablet, chewable: 1 tab(s) orally once a day  brimonidine 0.2% ophthalmic solution: 1 drop(s) to each affected eye 2 times a day  Multiple Vitamins oral capsule: 1 cap(s) orally once a day last dose per patient 4/21/2019  timolol maleate 0.5% ophthalmic solution: 1 drop(s) to each affected eye 2 times a day   acetaminophen 325 mg oral tablet: 2 tab(s) orally every 6 hours, As Needed for mild pain  amLODIPine 5 mg oral tablet: 1 tab(s) orally once a day  aspirin 81 mg oral tablet, chewable: 1 tab(s) orally once a day  atorvastatin 40 mg oral tablet: 1 tab(s) orally once a day  brimonidine 0.2% ophthalmic solution: 1 drop(s) to each affected eye 2 times a day  Multiple Vitamins oral capsule: 1 cap(s) orally once a day last dose per patient 4/21/2019  timolol maleate 0.5% ophthalmic solution: 1 drop(s) to each affected eye 2 times a day

## 2024-03-28 NOTE — PROGRESS NOTE ADULT - ASSESSMENT
ASSESSMENT AND PLAN: 73M h/o HTN, direct adm 4C under Dr. Mills for NPH LD trial, p/f gait imbalance x1-2yrs. PT saw, noted significant gait instability. Also endorses mild confusion and urinary incontinence. Outpatient MRI 2/7/24 w/ ventriculomegaly. Exam: AOx3, no drift, PERRL, EOMI, LIVINGSTON 5/5, SILT    s/p lumbar drain placement by Neuroradiology for NPH trial 3/26/24    NEURO:   - Continue neuro checks q 4  - Lumbar drain 18cc-20cc every 4 hours   - Daily PT evaluation for the trial (per PT did worse yesterday, will f/u today's evaluation)  - OT following for neuropsych evaluations  - Lumbar drain to be removed overnight/early Friday  - Pain control w/ Tylenol prn and Tramadol prn  - PT/OT: Home PT/OT    PULM:   - On room air, O2Sat>94%  - Incentive spirometry    CV:  - -160  - Continue Norvasc for HTN    ENDO:   - Goal euglycemia    HEME/ONC:             3/26 CBC stable         DVT ppx: SCDs, SQL discontinued for tonights drain pull    RENAL:   - IVL  - 3/26 BMP stable    ID:   - Afebrile    GI:    - Continue oral diet  - Senna, Miralax for bowel regimen; last BM 3/27  - MVT    MISC:  - Alphagan and Timolol eye drops    More than 30 minutes were spent educating the patient regarding condition, medications, follow up plans, signs and symptoms to be concerned with, preparing paperwork, and questions answered regarding discharge.     DISCHARGE PLANNING:   Home PT/OT once lumbar drain is out    Plan to be discussed w/ Dr. Mills  [id number]    ASSESSMENT AND PLAN: 73M h/o HTN, direct adm 4C under Dr. Mills for NPH LD trial, p/f gait imbalance x1-2yrs. PT saw, noted significant gait instability. Also endorses mild confusion and urinary incontinence. Outpatient MRI 2/7/24 w/ ventriculomegaly. Exam: AOx3, no drift, PERRL, EOMI, LIVINGSTON 5/5, SILT    s/p lumbar drain placement by Neuroradiology for NPH trial 3/26/24    NEURO:   - Continue neuro checks q 4  - Lumbar drain 18cc-20cc every 4 hours   - Daily PT evaluation for the trial (per PT did worse yesterday, will f/u today's evaluation)  - OT following for neuropsych evaluations  - Lumbar drain to be removed overnight/early Friday  - Pain control w/ Tylenol prn and Tramadol prn  - PT/OT: Home PT/OT    PULM:   - On room air, O2Sat>94%  - Incentive spirometry    CV:  - -160  - Continue Norvasc for HTN  -Appreciate Cardiology following - patient to follow with Dr. Joiner outpatient, to start Lipitor for HLD and Aspirin     ENDO:   - Goal euglycemia    HEME/ONC:             3/26 CBC stable         DVT ppx: SCDs, SQL discontinued for tonights drain pull    RENAL:   - IVL  - 3/26 BMP stable    ID:   - Afebrile    GI:    - Continue oral diet  - Senna, Miralax for bowel regimen; last BM 3/27  - MVT    MISC:  - Alphagan and Timolol eye drops    More than 30 minutes were spent educating the patient regarding condition, medications, follow up plans, signs and symptoms to be concerned with, preparing paperwork, and questions answered regarding discharge.     DISCHARGE PLANNING:   Home PT/OT once lumbar drain is out    Plan to be discussed w/ Dr. Mills  [id number]    ASSESSMENT AND PLAN: 73M h/o HTN, direct adm 4C under Dr. Mills for NPH LD trial, p/f gait imbalance x1-2yrs. PT saw, noted significant gait instability. Also endorses mild confusion and urinary incontinence. Outpatient MRI 2/7/24 w/ ventriculomegaly. Exam: AOx3, no drift, PERRL, EOMI, LIVINGSTON 5/5, SILT    s/p lumbar drain placement by Neuroradiology for NPH trial 3/26/24    NEURO:   - Continue neuro checks q 4  - Lumbar drain 18cc-20cc every 4 hours   - Daily PT evaluation for the trial (per PT did worse yesterday, will f/u today's evaluation)  - OT following for neuropsych evaluations  - Lumbar drain to be removed overnight/early Friday  - CT Brain stereo in am 3/29  - Pain control w/ Tylenol prn and Tramadol prn  - PT/OT: Home PT/OT    PULM:   - On room air, O2Sat>94%  - Incentive spirometry    CV:  - -160  - Continue Norvasc for HTN  -Appreciate Cardiology following - patient to follow with Dr. Joiner outpatient, to start Lipitor for HLD and Aspirin     ENDO:   - Goal euglycemia    HEME/ONC:             3/26 CBC stable         DVT ppx: SCDs, SQL discontinued for tonights drain pull    RENAL:   - IVL  - 3/26 BMP stable    ID:   - Afebrile    GI:    - Continue oral diet  - Senna, Miralax for bowel regimen; last BM 3/27  - MVT    MISC:  - Alphagan and Timolol eye drops    More than 30 minutes were spent educating the patient regarding condition, medications, follow up plans, signs and symptoms to be concerned with, preparing paperwork, and questions answered regarding discharge.     DISCHARGE PLANNING:   Home PT/OT once lumbar drain is out    Plan to be discussed w/ Dr. Mills  [id number]

## 2024-03-29 ENCOUNTER — TRANSCRIPTION ENCOUNTER (OUTPATIENT)
Age: 74
End: 2024-03-29

## 2024-03-29 VITALS
SYSTOLIC BLOOD PRESSURE: 106 MMHG | DIASTOLIC BLOOD PRESSURE: 66 MMHG | OXYGEN SATURATION: 98 % | RESPIRATION RATE: 18 BRPM | TEMPERATURE: 98 F | HEART RATE: 75 BPM

## 2024-03-29 PROCEDURE — 97165 OT EVAL LOW COMPLEX 30 MIN: CPT

## 2024-03-29 PROCEDURE — 97116 GAIT TRAINING THERAPY: CPT

## 2024-03-29 PROCEDURE — 86850 RBC ANTIBODY SCREEN: CPT

## 2024-03-29 PROCEDURE — 86901 BLOOD TYPING SEROLOGIC RH(D): CPT

## 2024-03-29 PROCEDURE — 85610 PROTHROMBIN TIME: CPT

## 2024-03-29 PROCEDURE — 99232 SBSQ HOSP IP/OBS MODERATE 35: CPT

## 2024-03-29 PROCEDURE — 70450 CT HEAD/BRAIN W/O DYE: CPT | Mod: 26

## 2024-03-29 PROCEDURE — 85027 COMPLETE CBC AUTOMATED: CPT

## 2024-03-29 PROCEDURE — 70450 CT HEAD/BRAIN W/O DYE: CPT | Mod: MC

## 2024-03-29 PROCEDURE — 97530 THERAPEUTIC ACTIVITIES: CPT

## 2024-03-29 PROCEDURE — 80048 BASIC METABOLIC PNL TOTAL CA: CPT

## 2024-03-29 PROCEDURE — 97110 THERAPEUTIC EXERCISES: CPT

## 2024-03-29 PROCEDURE — 86900 BLOOD TYPING SEROLOGIC ABO: CPT

## 2024-03-29 PROCEDURE — 62329 THER SPI PNXR CSF FLUOR/CT: CPT

## 2024-03-29 PROCEDURE — 85730 THROMBOPLASTIN TIME PARTIAL: CPT

## 2024-03-29 PROCEDURE — 86803 HEPATITIS C AB TEST: CPT

## 2024-03-29 PROCEDURE — 97162 PT EVAL MOD COMPLEX 30 MIN: CPT

## 2024-03-29 RX ORDER — ASPIRIN/CALCIUM CARB/MAGNESIUM 324 MG
1 TABLET ORAL
Qty: 0 | Refills: 0 | DISCHARGE
Start: 2024-03-29

## 2024-03-29 RX ORDER — TIMOLOL 0.5 %
1 DROPS OPHTHALMIC (EYE)
Qty: 0 | Refills: 0 | DISCHARGE
Start: 2024-03-29

## 2024-03-29 RX ORDER — BRIMONIDINE TARTRATE 2 MG/MG
1 SOLUTION/ DROPS OPHTHALMIC
Qty: 0 | Refills: 0 | DISCHARGE
Start: 2024-03-29

## 2024-03-29 RX ORDER — IBUPROFEN 200 MG
2 TABLET ORAL
Qty: 0 | Refills: 0 | DISCHARGE

## 2024-03-29 RX ADMIN — AMLODIPINE BESYLATE 5 MILLIGRAM(S): 2.5 TABLET ORAL at 05:20

## 2024-03-29 RX ADMIN — Medication 81 MILLIGRAM(S): at 12:00

## 2024-03-29 RX ADMIN — Medication 1 TABLET(S): at 11:59

## 2024-03-29 NOTE — PROGRESS NOTE ADULT - SUBJECTIVE AND OBJECTIVE BOX
C A R D I O L O G Y  **********************************     DATE OF SERVICE: 03-29-24    Patient denies chest pain or shortness of breath.   Review of symptoms otherwise negative.    MEDICATIONS:  acetaminophen     Tablet .. 650 milliGRAM(s) Oral every 6 hours PRN  amLODIPine   Tablet 5 milliGRAM(s) Oral daily  aspirin  chewable 81 milliGRAM(s) Oral daily  atorvastatin 40 milliGRAM(s) Oral at bedtime  brimonidine 0.2% Ophthalmic Solution 1 Drop(s) Both EYES two times a day  multivitamin 1 Tablet(s) Oral daily  ondansetron Injectable 4 milliGRAM(s) IV Push every 6 hours PRN  polyethylene glycol 3350 17 Gram(s) Oral daily  senna 2 Tablet(s) Oral at bedtime  timolol 0.5% Solution 1 Drop(s) Both EYES two times a day  traMADol 25 milliGRAM(s) Oral every 6 hours PRN      LABS:      Hemoglobin: 12.5 g/dL (03-26 @ 13:27)            Creatinine Trend: 1.15<--    COAGS:           PHYSICAL EXAM:  T(C): 36.9 (03-29-24 @ 08:01), Max: 37 (03-29-24 @ 04:35)  HR: 75 (03-29-24 @ 08:01) (60 - 75)  BP: 106/66 (03-29-24 @ 08:01) (106/66 - 153/72)  RR: 18 (03-29-24 @ 08:01) (18 - 19)  SpO2: 98% (03-29-24 @ 08:01) (97% - 100%)  Wt(kg): --    I&O's Summary    28 Mar 2024 07:01  -  29 Mar 2024 07:00  --------------------------------------------------------  IN: 360 mL / OUT: 77 mL / NET: 283 mL        Gen: NAD  HEENT:  (-)icterus (-)pallor  CV: N S1 S2 1/6 DAVIAN (+)2 Pulses B/l  Resp:  Clear to auscultation B/L, normal effort  GI: (+) BS Soft, NT, ND  Lymph:  (-)Edema, (-)obvious lymphadenopathy  Skin: Warm to touch, Normal turgor  Psych: Appropriate mood and affect      TELEMETRY: None	      ASSESSMENT/PLAN: Patient is a 72 y/o male well known to our office with PMH of HTN, HLD, TIA, and PAD s/p RLE angioplasty who is admitted for NPH Lumbar Drain trial.     #NPH  - s/p lumbar drain - now removed  - Management per neurosurgery    #HTN  - Continue Amlodipine 5mg daily  - Patient had office TTE 10/2023 with normal LV/RV function and office pharm NST with no ischemia in 2/2022    #PAD  - Continue Lipitor  - Restarted on ASA 81mg daily per neurosurgery    - No repeat cardiac testing planned at this time  - Will follow with you, please reach out if any questions/concerns  - Patient has f/u with Dr. Joiner on 5/10 at 10:55 AM    Oziel Arredondo PA-C  Pager: [phone]

## 2024-03-29 NOTE — PROGRESS NOTE ADULT - ASSESSMENT
73M h/o HTN, direct adm 4C under Dr. Mills for NPH LD trial, p/f gait imbalance x1-2yrs. PT saw, noted significant gait instability. Also endorses mild confusion and urinary incontinence. Outpatient MRI 2/7/24 w/ ventriculomegaly. Exam: AOx3, no drift, PERRL, EOMI, LIVINGST[place] 5/5, SILT    s/p lumbar drain placement by Neuroradiology for NPH trial 3/26/24    NEURO:   - Continue neuro checks q 4  - Lumbar drain 18cc-20cc every 4 hours   - Daily PT evaluation for the trial (per PT did worse yesterday, will f/u today's evaluation)  - OT following for neuropsych evaluations  - Lumbar drain to be removed overnight/early Friday  - CT Brain stereo in am 3/29  - Pain control w/ Tylenol prn and Tramadol prn  - PT/OT: Home PT/OT    PULM:   - On room air, O2Sat>94%  - Incentive spirometry    CV:  - -160  - Continue Norvasc for HTN  -Appreciate Cardiology following - patient to follow with Dr. Joiner outpatient, to start Lipitor for HLD and Aspirin     ENDO:   - Goal euglycemia    HEME/ONC:             3/26 CBC stable         DVT ppx: SCDs, SQL discontinued for tonights drain pull    RENAL:   - IVL  - 3/26 BMP stable    ID:   - Afebrile    GI:    - Continue oral diet  - Senna, Miralax for bowel regimen; last BM 3/27  - MVT    MISC:  - Alphagan and Timolol eye drops    More than 30 minutes were spent educating the patient regarding condition, medications, follow up plans, signs and symptoms to be concerned with, preparing paperwork, and questions answered regarding discharge.     DISCHARGE PLANNING:   Home PT/OT once lumbar drain is out    Plan to be discussed w/ Dr. Mills  [id number]    73M h/o HTN, direct adm 4C under Dr. Mills for NPH LD trial, p/f gait imbalance x1-2yrs. PT saw, noted significant gait instability. Also endorses mild confusion and urinary incontinence. Outpatient MRI 2/7/24 w/ ventriculomegaly. Exam: AOx3, no drift, PERRL, EOMI, LIVINGSTON 5/5, SILT    s/p lumbar drain placement by Neuroradiology for NPH trial 3/26/24    NEURO:   - Continue neuro checks q 4  - Lumbar drain 18cc-20cc every 4 hours was discontinued overnight  - Daily PT evaluation for the trial (per PT better yesterday  - OT following for neuropsych evaluations   - CT Brain stereo today  - Pain control w/ Tylenol prn and Tramadol prn  - PT/OT: Home PT/OT    PULM:   - On room air, O2Sat>95%  - Incentive spirometry    CV:  - -160  - Continue Norvasc for HTN  -Appreciate Cardiology following - patient to follow with Dr. Joiner outpatient, to start Lipitor for HLD and Aspirin     ENDO:   - Goal euglycemia    HEME/ONC:             3/26 CBC stable         DVT ppx: SCDs, SQL discontinued for drain pull    RENAL:   - IVL  - 3/26 BMP stable    ID:   - Afebrile    GI:    - Continue oral diet  - Senna, Miralax for bowel regimen; last BM 3/27  - MVT    MISC:  - Alphagan and Timolol eye drops    DISCHARGE PLANNING:   Dispo: Home PT/OT    discussed w/ Dr. Mills  [id number]    73M h/o HTN, direct adm 4C under Dr. Mills for NPH LD trial, p/f gait imbalance x1-2yrs. PT saw, noted significant gait instability. Also endorses mild confusion and urinary incontinence. Outpatient MRI 2/7/24 w/ ventriculomegaly. Exam: AOx3, no drift, PERRL, EOMI, LIVINGSTON 5/5, SILT    s/p lumbar drain placement by Neuroradiology for NPH trial 3/26/24    NEURO:   - Continue neuro checks q 4  - Lumbar drain 18cc-20cc every 4 hours was discontinued overnight  - Daily PT evaluation for the trial (per PT better yesterday  - OT following for neuropsych evaluations   - CT Brain stereo today  - Pain control w/ Tylenol prn and Tramadol prn  - PT/OT: Home PT/OT    PULM:   - On room air, O2Sat>95%  - Incentive spirometry    CV:  - -160  - Continue Norvasc for HTN  -Appreciate Cardiology following - patient to follow with Dr. Joiner outpatient, to start Lipitor for HLD and Aspirin     ENDO:   - Goal euglycemia    HEME/ONC:             3/26 CBC stable         DVT ppx: SCDs, SQL discontinued for drain pull    RENAL:   - IVL  - 3/26 BMP stable    ID:   - Afebrile    GI:    - Continue oral diet  - Senna, Miralax for bowel regimen; last BM 3/27  - MVT    MISC:  - Alphagan and Timolol eye drops    DISCHARGE PLANNING:   Dispo: Out patient PT    discussed w/ Dr. Mills  [id number]

## 2024-03-29 NOTE — DISCHARGE NOTE NURSING/CASE MANAGEMENT/SOCIAL WORK - NSDCPEEMAIL_GEN_ALL_CORE
[facility] email [email or web address]

## 2024-03-29 NOTE — PROGRESS NOTE ADULT - SUBJECTIVE AND OBJECTIVE BOX
73M h/o HTN, direct adm 4C under Dr. Mills for NPH LD trial, p/f gait imbalance x1-2yrs. PT saw, noted significant gait instability. Also endorses mild confusion and urinary incontinence. Outpatient MRI 2/7/24 w/ ventriculomegaly. Exam: AOx3, no drift, PERRL, EOMI, LIVING[place] 5/5, SILT    Post-op day # 3 s/p Lumbar drain trial    Overnight event: no acute event    Vital Signs Last 24 Hrs  T(C): 37 (29 Mar 2024 04:35), Max: 37 (28 Mar 2024 12:10)  T(F): 98.6 (29 Mar 2024 04:35), Max: 98.6 (28 Mar 2024 12:10)  HR: 60 (29 Mar 2024 04:35) (58 - 72)  BP: 108/72 (29 Mar 2024 04:35) (105/67 - 153/72)  BP(mean): --  RR: 19 (29 Mar 2024 04:35) (18 - 19)  SpO2: 97% (29 Mar 2024 04:35) (95% - 100%)    Parameters below as of 29 Mar 2024 04:35  Patient On (Oxygen Delivery Method): room air                Stroke Core Measures      DRAIN OUTPUT:     NEUROIMAGING:     PHYSICAL EXAM:    General: No Acute Distress     Neurological: Awake, alert oriented to person, place and time, Following Commands, PERRL, EOMI, Face Symmetrical, Speech Fluent, Moving all extremities, Muscle Strength normal in all four extremities, No Drift, Sensation to Light Touch Intact    Pulmonary: Clear to Auscultation, No Rales, No Rhonchi, No Wheezes     Cardiovascular: S1, S2, Regular Rate and Rhythm     Gastrointestinal: Soft, Nontender, Nondistended     Incision:     MEDICATIONS:   Antibiotics:    Neuro:  acetaminophen     Tablet .. 650 milliGRAM(s) Oral every 6 hours PRN Temp greater or equal to 38C (100.4F), Mild Pain (1 - 3)  ondansetron Injectable 4 milliGRAM(s) IV Push every 6 hours PRN Nausea and/or Vomiting  traMADol 25 milliGRAM(s) Oral every 6 hours PRN Moderate Pain (4 - 6)    Anticoagulation:  aspirin  chewable 81 milliGRAM(s) Oral daily    Cardiology:  amLODIPine   Tablet 5 milliGRAM(s) Oral daily    Endo:   atorvastatin 40 milliGRAM(s) Oral at bedtime    Pulm:    GI/:  polyethylene glycol 3350 17 Gram(s) Oral daily  senna 2 Tablet(s) Oral at bedtime    Other:  brimonidine 0.2% Ophthalmic Solution 1 Drop(s) Both EYES two times a day  multivitamin 1 Tablet(s) Oral daily  timolol 0.5% Solution 1 Drop(s) Both EYES two times a day  polyethylene glycol 3350 17 Gram(s) Oral daily  senna 2 Tablet(s) Oral at bedtime    73M h/o HTN, direct adm 4C under Dr. Mills for NPH LD trial, p/f gait imbalance x1-2yrs. PT saw, noted significant gait instability. Also endorses mild confusion and urinary incontinence. Outpatient MRI 2/7/24 w/ ventriculomegaly. Exam: AOx3, no drift, PERRL, EOMI, LIVING[place] 5/5, SILT    Post-op day # 3 s/p Lumbar drain trial    Overnight event: no acute event    Vital Signs Last 24 Hrs  T(C): 37 (29 Mar 2024 04:35), Max: 37 (28 Mar 2024 12:10)  T(F): 98.6 (29 Mar 2024 04:35), Max: 98.6 (28 Mar 2024 12:10)  HR: 60 (29 Mar 2024 04:35) (58 - 72)  BP: 108/72 (29 Mar 2024 04:35) (105/67 - 153/72)  BP(mean): --  RR: 19 (29 Mar 2024 04:35) (18 - 19)  SpO2: 97% (29 Mar 2024 04:35) (95% - 100%)    Parameters below as of 29 Mar 2024 04:35  Patient On (Oxygen Delivery Method): room air                Stroke Core Measures      DRAIN OUTPUT:     NEUROIMAGING:     PHYSICAL EXAM:    General: No Acute Distress     Neurological: Awake, alert oriented to person, place and time, Following Commands, PERRL, EOMI, Face Symmetrical, Speech Fluent, Moving all extremities, Muscle Strength normal in all four extremities, No Drift, Sensation to Light Touch Intact    Pulmonary: Clear to Auscultation, No Rales, No Rhonchi, No Wheezes     Cardiovascular: S1, S2, Regular Rate and Rhythm     Gastrointestinal: Soft, Nontender, Nondistended     Incision: intact    MEDICATIONS:   Antibiotics:    Neuro:  acetaminophen     Tablet .. 650 milliGRAM(s) Oral every 6 hours PRN Temp greater or equal to 38C (100.4F), Mild Pain (1 - 3)  ondansetron Injectable 4 milliGRAM(s) IV Push every 6 hours PRN Nausea and/or Vomiting  traMADol 25 milliGRAM(s) Oral every 6 hours PRN Moderate Pain (4 - 6)    Anticoagulation:  aspirin  chewable 81 milliGRAM(s) Oral daily    Cardiology:  amLODIPine   Tablet 5 milliGRAM(s) Oral daily    Endo:   atorvastatin 40 milliGRAM(s) Oral at bedtime    Pulm:    GI/:  polyethylene glycol 3350 17 Gram(s) Oral daily  senna 2 Tablet(s) Oral at bedtime    Other:  brimonidine 0.2% Ophthalmic Solution 1 Drop(s) Both EYES two times a day  multivitamin 1 Tablet(s) Oral daily  timolol 0.5% Solution 1 Drop(s) Both EYES two times a day  polyethylene glycol 3350 17 Gram(s) Oral daily  senna 2 Tablet(s) Oral at bedtime    73M h/o HTN, direct adm 4C under Dr. Mills for NPH LD trial, p/f gait imbalance x1-2yrs. PT saw, noted significant gait instability. Also endorses mild confusion and urinary incontinence. Outpatient MRI 2/7/24 w/ ventriculomegaly. Exam: AOx3, no drift, PERRL, EOMI, LIV[place] 5/5, SILT    Post-op day # 3 s/p Lumbar drain trial    Overnight event: no acute event, patient did better yesterday with PT    Vital Signs Last 24 Hrs  T(C): 37 (29 Mar 2024 04:35), Max: 37 (28 Mar 2024 12:10)  T(F): 98.6 (29 Mar 2024 04:35), Max: 98.6 (28 Mar 2024 12:10)  HR: 60 (29 Mar 2024 04:35) (58 - 72)  BP: 108/72 (29 Mar 2024 04:35) (105/67 - 153/72)  BP(mean): --  RR: 19 (29 Mar 2024 04:35) (18 - 19)  SpO2: 97% (29 Mar 2024 04:35) (95% - 100%)    Parameters below as of 29 Mar 2024 04:35  Patient On (Oxygen Delivery Method): room air        Stroke Core Measures      DRAIN OUTPUT:     NEUROIMAGING:     PHYSICAL EXAM:    General: No Acute Distress     Neurological: Awake, alert oriented to person, place and time, Following Commands, PERRL, EOMI, Face Symmetrical, Speech Fluent, Moving all extremities, Muscle Strength normal in all four extremities, No Drift, Sensation to Light Touch Intact    Pulmonary: Clear to Auscultation, No Rales, No Rhonchi, No Wheezes     Cardiovascular: S1, S2, Regular Rate and Rhythm     Gastrointestinal: Soft, Nontender, Nondistended     Incision: intact    MEDICATIONS:   Antibiotics:    Neuro:  acetaminophen     Tablet .. 650 milliGRAM(s) Oral every 6 hours PRN Temp greater or equal to 38C (100.4F), Mild Pain (1 - 3)  ondansetron Injectable 4 milliGRAM(s) IV Push every 6 hours PRN Nausea and/or Vomiting  traMADol 25 milliGRAM(s) Oral every 6 hours PRN Moderate Pain (4 - 6)    Anticoagulation:  aspirin  chewable 81 milliGRAM(s) Oral daily    Cardiology:  amLODIPine   Tablet 5 milliGRAM(s) Oral daily    Endo:   atorvastatin 40 milliGRAM(s) Oral at bedtime    Pulm:    GI/:  polyethylene glycol 3350 17 Gram(s) Oral daily  senna 2 Tablet(s) Oral at bedtime    Other:  brimonidine 0.2% Ophthalmic Solution 1 Drop(s) Both EYES two times a day  multivitamin 1 Tablet(s) Oral daily  timolol 0.5% Solution 1 Drop(s) Both EYES two times a day  polyethylene glycol 3350 17 Gram(s) Oral daily  senna 2 Tablet(s) Oral at bedtime

## 2024-03-29 NOTE — DISCHARGE NOTE NURSING/CASE MANAGEMENT/SOCIAL WORK - NSDCPEWEB_GEN_ALL_CORE
[facility] for Tobacco Control website --- [email or web address] --- [email or web address]

## 2024-03-29 NOTE — DISCHARGE NOTE NURSING/CASE MANAGEMENT/SOCIAL WORK - PATIENT PORTAL LINK FT
You can access the FollowMyHealth Patient Portal offered by [facility] by registering at the following website: [email or web address]. By joining [employer]’s FollowMyHealth portal, you will also be able to view your health information using other applications (apps) compatible with our system.

## 2024-04-02 ENCOUNTER — NON-APPOINTMENT (OUTPATIENT)
Age: 74
End: 2024-04-02

## 2024-04-11 ENCOUNTER — APPOINTMENT (OUTPATIENT)
Dept: SPINE | Facility: CLINIC | Age: 74
End: 2024-04-11

## 2024-04-11 VITALS
HEART RATE: 79 BPM | OXYGEN SATURATION: 100 % | DIASTOLIC BLOOD PRESSURE: 73 MMHG | BODY MASS INDEX: 29.55 KG/M2 | HEIGHT: 68 IN | SYSTOLIC BLOOD PRESSURE: 139 MMHG | WEIGHT: 195 LBS

## 2024-04-22 ENCOUNTER — APPOINTMENT (OUTPATIENT)
Dept: SPINE | Facility: CLINIC | Age: 74
End: 2024-04-22
Payer: MEDICARE

## 2024-04-22 VITALS
SYSTOLIC BLOOD PRESSURE: 130 MMHG | DIASTOLIC BLOOD PRESSURE: 75 MMHG | HEIGHT: 68 IN | WEIGHT: 195 LBS | HEART RATE: 74 BPM | OXYGEN SATURATION: 95 % | BODY MASS INDEX: 29.55 KG/M2

## 2024-04-22 DIAGNOSIS — G91.2 (IDIOPATHIC) NORMAL PRESSURE HYDROCEPHALUS: ICD-10-CM

## 2024-04-22 PROCEDURE — 99213 OFFICE O/P EST LOW 20 MIN: CPT

## 2024-04-22 NOTE — PHYSICAL EXAM
[General Appearance - Alert] : alert [General Appearance - In No Acute Distress] : in no acute distress [Oriented To Time, Place, And Person] : oriented to person, place, and time [Impaired Insight] : insight and judgment were intact [Affect] : the affect was normal [Limited Balance] : the patient's balance was impaired [Outer Ear] : the ears and nose were normal in appearance [Oropharynx] : the oropharynx was normal [Neck Appearance] : the appearance of the neck was normal [Neck Cervical Mass (___cm)] : no neck mass was observed [Jugular Venous Distention Increased] : there was no jugular-venous distention [Thyroid Diffuse Enlargement] : the thyroid was not enlarged [Thyroid Nodule] : there were no palpable thyroid nodules [Auscultation Breath Sounds / Voice Sounds] : lungs were clear to auscultation bilaterally [Heart Rate And Rhythm] : heart rate was normal and rhythm regular [Heart Sounds] : normal S1 and S2 [Heart Sounds Gallop] : no gallops [Murmurs] : no murmurs [Heart Sounds Pericardial Friction Rub] : no pericardial rub [Full Pulse] : the pedal pulses are present [Edema] : there was no peripheral edema [No CVA Tenderness] : no ~M costovertebral angle tenderness [No Spinal Tenderness] : no spinal tenderness [Nail Clubbing] : no clubbing  or cyanosis of the fingernails [Musculoskeletal - Swelling] : no joint swelling seen [Motor Tone] : muscle strength and tone were normal [Skin Color & Pigmentation] : normal skin color and pigmentation [Skin Turgor] : normal skin turgor [] : no rash [FreeTextEntry6] : shuffling gait, umbalanced. Difficulty stepping up [FreeTextEntry1] : shuffling gait

## 2024-04-22 NOTE — REVIEW OF SYSTEMS
[Dizziness] : dizziness [Lightheadedness] : lightheadedness [Difficulty Walking] : difficulty walking [Negative] : Endocrine

## 2024-04-22 NOTE — REASON FOR VISIT
[Follow-Up: _____] : a [unfilled] follow-up visit [Spouse] : spouse [FreeTextEntry1] : This is a very pleasant 73-year-old male who presents today accompanied by his wife. He had a lumbar drain trial on 3/26 due to shuffling gait, instability, extremity weakness. Patient states that after the drain was remove, he did well for a week, however, the pre drain (NPH) symptoms have returned. HE presents to discuss what the next step is as far as having a shunt placed.  Patient continues to smoke but states that he is down to "a little less than a pack a day"

## 2024-04-22 NOTE — ASSESSMENT
[FreeTextEntry1] : Surgery recommendation was a result of shared decision making. The patient has tried conservative treatment and failed therapy. A long discussion occurred regarding all surgical and non-surgical options.   After detailed imaging was reviewed and the patient was examined, surgical intervention was discussed with the patient to halt progression of symptoms. The potential risks and benefits of surgical intervention as well as alternative treatment options were outlined in great detail. Adequate time was allowed for the patient to ask questions. The patient stated an understanding to all answers given. Pre-op requirements, medical clearance and pre op testing as well as post- op expectations were discussed in detail.  Patient agrees to proceed with surgery.  Pre op planning and care coordination in progress.

## 2024-06-05 ENCOUNTER — OUTPATIENT (OUTPATIENT)
Dept: OUTPATIENT SERVICES | Facility: HOSPITAL | Age: 74
LOS: 1 days | End: 2024-06-05
Payer: COMMERCIAL

## 2024-06-05 VITALS
HEIGHT: 68 IN | TEMPERATURE: 99 F | HEART RATE: 66 BPM | OXYGEN SATURATION: 100 % | WEIGHT: 190.04 LBS | RESPIRATION RATE: 17 BRPM | DIASTOLIC BLOOD PRESSURE: 72 MMHG | SYSTOLIC BLOOD PRESSURE: 135 MMHG

## 2024-06-05 DIAGNOSIS — G91.2 (IDIOPATHIC) NORMAL PRESSURE HYDROCEPHALUS: ICD-10-CM

## 2024-06-05 DIAGNOSIS — Z98.49 CATARACT EXTRACTION STATUS, UNSPECIFIED EYE: Chronic | ICD-10-CM

## 2024-06-05 DIAGNOSIS — Z98.890 OTHER SPECIFIED POSTPROCEDURAL STATES: Chronic | ICD-10-CM

## 2024-06-05 DIAGNOSIS — Z29.9 ENCOUNTER FOR PROPHYLACTIC MEASURES, UNSPECIFIED: ICD-10-CM

## 2024-06-05 DIAGNOSIS — Z01.818 ENCOUNTER FOR OTHER PREPROCEDURAL EXAMINATION: ICD-10-CM

## 2024-06-05 DIAGNOSIS — I10 ESSENTIAL (PRIMARY) HYPERTENSION: ICD-10-CM

## 2024-06-05 LAB
ANION GAP SERPL CALC-SCNC: 11 MMOL/L — SIGNIFICANT CHANGE UP (ref 5–17)
BLD GP AB SCN SERPL QL: NEGATIVE — SIGNIFICANT CHANGE UP
BUN SERPL-MCNC: 15 MG/DL — SIGNIFICANT CHANGE UP (ref 7–23)
CALCIUM SERPL-MCNC: 10.6 MG/DL — HIGH (ref 8.4–10.5)
CHLORIDE SERPL-SCNC: 105 MMOL/L — SIGNIFICANT CHANGE UP (ref 96–108)
CO2 SERPL-SCNC: 24 MMOL/L — SIGNIFICANT CHANGE UP (ref 22–31)
CREAT SERPL-MCNC: 1.16 MG/DL — SIGNIFICANT CHANGE UP (ref 0.5–1.3)
EGFR: 66 ML/MIN/1.73M2 — SIGNIFICANT CHANGE UP
GLUCOSE SERPL-MCNC: 100 MG/DL — HIGH (ref 70–99)
HCT VFR BLD CALC: 41.1 % — SIGNIFICANT CHANGE UP (ref 39–50)
HGB BLD-MCNC: 13.4 G/DL — SIGNIFICANT CHANGE UP (ref 13–17)
MCHC RBC-ENTMCNC: 27 PG — SIGNIFICANT CHANGE UP (ref 27–34)
MCHC RBC-ENTMCNC: 32.6 GM/DL — SIGNIFICANT CHANGE UP (ref 32–36)
MCV RBC AUTO: 82.9 FL — SIGNIFICANT CHANGE UP (ref 80–100)
MRSA PCR RESULT.: SIGNIFICANT CHANGE UP
NRBC # BLD: 0 /100 WBCS — SIGNIFICANT CHANGE UP (ref 0–0)
PLATELET # BLD AUTO: 356 K/UL — SIGNIFICANT CHANGE UP (ref 150–400)
POTASSIUM SERPL-MCNC: 4.4 MMOL/L — SIGNIFICANT CHANGE UP (ref 3.5–5.3)
POTASSIUM SERPL-SCNC: 4.4 MMOL/L — SIGNIFICANT CHANGE UP (ref 3.5–5.3)
RBC # BLD: 4.96 M/UL — SIGNIFICANT CHANGE UP (ref 4.2–5.8)
RBC # FLD: 15.6 % — HIGH (ref 10.3–14.5)
RH IG SCN BLD-IMP: POSITIVE — SIGNIFICANT CHANGE UP
S AUREUS DNA NOSE QL NAA+PROBE: SIGNIFICANT CHANGE UP
SODIUM SERPL-SCNC: 140 MMOL/L — SIGNIFICANT CHANGE UP (ref 135–145)
WBC # BLD: 9.08 K/UL — SIGNIFICANT CHANGE UP (ref 3.8–10.5)
WBC # FLD AUTO: 9.08 K/UL — SIGNIFICANT CHANGE UP (ref 3.8–10.5)

## 2024-06-05 PROCEDURE — 80048 BASIC METABOLIC PNL TOTAL CA: CPT

## 2024-06-05 PROCEDURE — 86901 BLOOD TYPING SEROLOGIC RH(D): CPT

## 2024-06-05 PROCEDURE — 85027 COMPLETE CBC AUTOMATED: CPT

## 2024-06-05 PROCEDURE — 87641 MR-STAPH DNA AMP PROBE: CPT

## 2024-06-05 PROCEDURE — G0463: CPT

## 2024-06-05 PROCEDURE — 87640 STAPH A DNA AMP PROBE: CPT

## 2024-06-05 PROCEDURE — 86850 RBC ANTIBODY SCREEN: CPT

## 2024-06-05 PROCEDURE — 86900 BLOOD TYPING SEROLOGIC ABO: CPT

## 2024-06-05 RX ORDER — ATORVASTATIN CALCIUM 80 MG/1
1 TABLET, FILM COATED ORAL
Refills: 0 | DISCHARGE

## 2024-06-05 RX ORDER — ACETAMINOPHEN 500 MG
2 TABLET ORAL
Qty: 0 | Refills: 0 | DISCHARGE

## 2024-06-05 NOTE — H&P PST ADULT - TEMPERATURE IN FAHRENHEIT (DEGREES F)
Nutrition Services    Patient Name: Rebecca Stafford  YOB: 1963  MRN: [id number]  Admission date: 10/11/10/29/23  0503 10/28/23  0434 10/27/23  0423 10/24/23  0512 10/24/Sommer Casillas RD  10/29/23 13:12 EDT     98.8

## 2024-06-05 NOTE — H&P PST ADULT - HISTORY OF PRESENT ILLNESS
This is a 74 year old male with past medical history of HTN, Prostate cancer (2011) maintinaed on Lupron injections every 3 months, normal pressure hydrocephalus s/p Lumbar drain trial with improvement in gait for about 1 year. Was recently admitted to [facility]  3/26-3/29/24, due to Outpatient MRI 2/7/24 w/ ventriculomegaly with lumbar drain placed. Today presenting to PST for scheduled Laparoscopic  shunt on 6/18/24 with Suman De La Garza to add.      This is a 74 year old male with past medical history of HTN, Prostate cancer (2011) maintained on Lupron injections every 3 months, normal pressure hydrocephalus s/p Lumbar drain trial with improvement in gait for about 1 year. Was recently admitted to [facility]  3/26-3/29/24, due to Outpatient MRI 2/7/24 w/ ventriculomegaly with lumbar drain placed. Today presenting to PST for scheduled Laparoscopic  shunt on 6/18/24 with Suman De La Garza to add.     *Of note, pt endorsed a having left lower molar slightly loose tooth. Tooth examined by this writer- tooth barely loose.

## 2024-06-05 NOTE — H&P PST ADULT - ASSESSMENT
DASI Score: 4.3  DASI Activity: able to go up one flight of stairs or walk 1 blocks with cane. Self care, Drives   Loose or removable teeth: denies        CAPRINI SCORE    AGE RELATED RISK FACTORS                                                       MOBILITY RELATED FACTORS  [ ] Age 41-60 years                                            (1 Point)                  [ ] Bed rest                                                        (1 Point)  [ ] Age: 61-74 years                                           (2 Points)                [ ] Plaster cast                                                   (2 Points)  [x ] Age= 75 years                                              (3 Points)                 [ ] Bed bound for more than 72 hours                   (2 Points)    DISEASE RELATED RISK FACTORS                                               GENDER SPECIFIC FACTORS  [ ] Edema in the lower extremities                       (1 Point)                  [ ] Pregnancy                                                     (1 Point)  [ ] Varicose veins                                               (1 Point)                  [ ] Post-partum < 6 weeks                                   (1 Point)             [x ] BMI > 25 Kg/m2                                            (1 Point)                  [ ] Hormonal therapy  or oral contraception            (1 Point)                 [ ] Sepsis (in the previous month)                        (1 Point)                  [ ] History of pregnancy complications  [ ] Pneumonia or serious lung disease                                               [ ] Unexplained or recurrent                       (1 Point)           (in the previous month)                               (1 Point)  [ ] Abnormal pulmonary function test                     (1 Point)                 SURGERY RELATED RISK FACTORS  [ ] Acute myocardial infarction                              (1 Point)                 [ ]  Section                                            (1 Point)  [ ] Congestive heart failure (in the previous month)  (1 Point)                 [ ] Minor surgery                                                 (1 Point)   [ ] Inflammatory bowel disease                             (1 Point)                 [ ] Arthroscopic surgery                                        (2 Points)  [ ] Central venous access                                    (2 Points)                [x ] General surgery lasting more than 45 minutes   (2 Points)       [ ] Stroke (in the previous month)                          (5 Points)               [ ] Elective arthroplasty                                        (5 Points)                                                                                                                                               HEMATOLOGY RELATED FACTORS                                                 TRAUMA RELATED RISK FACTORS  [ ] Prior episodes of VTE                                     (3 Points)                 [ ] Fracture of the hip, pelvis, or leg                       (5 Points)  [ ] Positive family history for VTE                         (3 Points)                 [ ] Acute spinal cord injury (in the previous month)  (5 Points)  [ ] Prothrombin 91671 A                                      (3 Points)                 [ ] Paralysis  (less than 1 month)                          (5 Points)  [ ] Factor V Leiden                                             (3 Points)                 [ ] Multiple Trauma within 1 month                         (5 Points)  [ ] Lupus anticoagulants                                     (3 Points)                                                           [ ] Anticardiolipin antibodies                                (3 Points)                                                       [ ] High homocysteine in the blood                      (3 Points)                                             [ ] Other congenital or acquired thrombophilia       (3 Points)                                                [ ] Heparin induced thrombocytopenia                  (3 Points)                                          Total Score [ 6   ]

## 2024-06-05 NOTE — H&P PST ADULT - OTHER CARE PROVIDERS
Dr. Kevin Joiner- Cardiologist- Last visit May 2023, Dr. Clarita Ray (Urology@ [facility])

## 2024-06-17 ENCOUNTER — TRANSCRIPTION ENCOUNTER (OUTPATIENT)
Age: 74
End: 2024-06-17

## 2024-06-18 ENCOUNTER — APPOINTMENT (OUTPATIENT)
Dept: SPINE | Facility: HOSPITAL | Age: 74
End: 2024-06-18

## 2024-06-18 ENCOUNTER — INPATIENT (INPATIENT)
Facility: HOSPITAL | Age: 74
LOS: 0 days | Discharge: HOME | DRG: 57 | End: 2024-06-19
Attending: NEUROLOGICAL SURGERY | Admitting: NEUROLOGICAL SURGERY
Payer: COMMERCIAL

## 2024-06-18 ENCOUNTER — APPOINTMENT (OUTPATIENT)
Dept: SURGERY | Facility: HOSPITAL | Age: 74
End: 2024-06-18
Payer: MEDICARE

## 2024-06-18 VITALS
HEIGHT: 68 IN | WEIGHT: 190.04 LBS | HEART RATE: 64 BPM | RESPIRATION RATE: 18 BRPM | SYSTOLIC BLOOD PRESSURE: 166 MMHG | OXYGEN SATURATION: 97 % | TEMPERATURE: 98 F | DIASTOLIC BLOOD PRESSURE: 78 MMHG

## 2024-06-18 DIAGNOSIS — G91.2 (IDIOPATHIC) NORMAL PRESSURE HYDROCEPHALUS: ICD-10-CM

## 2024-06-18 DIAGNOSIS — Z98.890 OTHER SPECIFIED POSTPROCEDURAL STATES: Chronic | ICD-10-CM

## 2024-06-18 DIAGNOSIS — Z98.49 CATARACT EXTRACTION STATUS, UNSPECIFIED EYE: Chronic | ICD-10-CM

## 2024-06-18 LAB
ANION GAP SERPL CALC-SCNC: 14 MMOL/L — SIGNIFICANT CHANGE UP (ref 5–17)
BUN SERPL-MCNC: 16 MG/DL — SIGNIFICANT CHANGE UP (ref 7–23)
CALCIUM SERPL-MCNC: 10.4 MG/DL — SIGNIFICANT CHANGE UP (ref 8.4–10.5)
CHLORIDE SERPL-SCNC: 105 MMOL/L — SIGNIFICANT CHANGE UP (ref 96–108)
CO2 SERPL-SCNC: 25 MMOL/L — SIGNIFICANT CHANGE UP (ref 22–31)
CREAT SERPL-MCNC: 1.24 MG/DL — SIGNIFICANT CHANGE UP (ref 0.5–1.3)
EGFR: 60 ML/MIN/1.73M2 — SIGNIFICANT CHANGE UP
GLUCOSE SERPL-MCNC: 128 MG/DL — HIGH (ref 70–99)
HCT VFR BLD CALC: 41.8 % — SIGNIFICANT CHANGE UP (ref 39–50)
HGB BLD-MCNC: 13.4 G/DL — SIGNIFICANT CHANGE UP (ref 13–17)
MAGNESIUM SERPL-MCNC: 2.2 MG/DL — SIGNIFICANT CHANGE UP (ref 1.6–2.6)
MCHC RBC-ENTMCNC: 26.8 PG — LOW (ref 27–34)
MCHC RBC-ENTMCNC: 32.1 GM/DL — SIGNIFICANT CHANGE UP (ref 32–36)
MCV RBC AUTO: 83.6 FL — SIGNIFICANT CHANGE UP (ref 80–100)
NRBC # BLD: 0 /100 WBCS — SIGNIFICANT CHANGE UP (ref 0–0)
PHOSPHATE SERPL-MCNC: 2.9 MG/DL — SIGNIFICANT CHANGE UP (ref 2.5–4.5)
PLATELET # BLD AUTO: 334 K/UL — SIGNIFICANT CHANGE UP (ref 150–400)
POTASSIUM SERPL-MCNC: 4.5 MMOL/L — SIGNIFICANT CHANGE UP (ref 3.5–5.3)
POTASSIUM SERPL-SCNC: 4.5 MMOL/L — SIGNIFICANT CHANGE UP (ref 3.5–5.3)
RBC # BLD: 5 M/UL — SIGNIFICANT CHANGE UP (ref 4.2–5.8)
RBC # FLD: 15.5 % — HIGH (ref 10.3–14.5)
SODIUM SERPL-SCNC: 144 MMOL/L — SIGNIFICANT CHANGE UP (ref 135–145)
WBC # BLD: 14.57 K/UL — HIGH (ref 3.8–10.5)
WBC # FLD AUTO: 14.57 K/UL — HIGH (ref 3.8–10.5)

## 2024-06-18 PROCEDURE — 61781 SCAN PROC CRANIAL INTRA: CPT

## 2024-06-18 PROCEDURE — 62223 ESTABLISH BRAIN CAVITY SHUNT: CPT | Mod: AS

## 2024-06-18 PROCEDURE — 62223 ESTABLISH BRAIN CAVITY SHUNT: CPT | Mod: 62

## 2024-06-18 PROCEDURE — 61781 SCAN PROC CRANIAL INTRA: CPT | Mod: AS

## 2024-06-18 PROCEDURE — 70450 CT HEAD/BRAIN W/O DYE: CPT | Mod: 26

## 2024-06-18 PROCEDURE — 93970 EXTREMITY STUDY: CPT | Mod: 26

## 2024-06-18 DEVICE — CATH KIT ARES: Type: IMPLANTABLE DEVICE | Site: RIGHT | Status: FUNCTIONAL

## 2024-06-18 DEVICE — BONE WAX 2.5GM: Type: IMPLANTABLE DEVICE | Site: RIGHT | Status: FUNCTIONAL

## 2024-06-18 DEVICE — VALVE CODMAN CERTAS PLUS INLINE WITH SIPHONGUARD: Type: IMPLANTABLE DEVICE | Site: RIGHT | Status: FUNCTIONAL

## 2024-06-18 DEVICE — SURGIFOAM PAD 8CM X 12.5CM X 10MM (100): Type: IMPLANTABLE DEVICE | Site: RIGHT | Status: FUNCTIONAL

## 2024-06-18 RX ORDER — HYDROMORPHONE HCL 0.2 MG/ML
0.25 INJECTION, SOLUTION INTRAVENOUS
Refills: 0 | Status: DISCONTINUED | OUTPATIENT
Start: 2024-06-18 | End: 2024-06-18

## 2024-06-18 RX ORDER — SENNOSIDES 8.6 MG
2 TABLET ORAL AT BEDTIME
Refills: 0 | Status: DISCONTINUED | OUTPATIENT
Start: 2024-06-18 | End: 2024-06-19

## 2024-06-18 RX ORDER — TIMOLOL MALEATE 3.4 MG/ML
1 SOLUTION/ DROPS OPHTHALMIC
Refills: 0 | Status: DISCONTINUED | OUTPATIENT
Start: 2024-06-18 | End: 2024-06-19

## 2024-06-18 RX ORDER — CEFAZOLIN 10 G/1
2000 INJECTION, POWDER, FOR SOLUTION INTRAVENOUS EVERY 8 HOURS
Refills: 0 | Status: COMPLETED | OUTPATIENT
Start: 2024-06-18 | End: 2024-06-19

## 2024-06-18 RX ORDER — AMLODIPINE BESYLATE 2.5 MG/1
1 TABLET ORAL
Qty: 0 | Refills: 0 | DISCHARGE

## 2024-06-18 RX ORDER — ONDANSETRON HYDROCHLORIDE 2 MG/ML
4 INJECTION INTRAMUSCULAR; INTRAVENOUS EVERY 6 HOURS
Refills: 0 | Status: DISCONTINUED | OUTPATIENT
Start: 2024-06-18 | End: 2024-06-19

## 2024-06-18 RX ORDER — POLYETHYLENE GLYCOL 3350 1 G/G
17 POWDER ORAL DAILY
Refills: 0 | Status: DISCONTINUED | OUTPATIENT
Start: 2024-06-18 | End: 2024-06-19

## 2024-06-18 RX ORDER — ASPIRIN 325 MG/1
81 TABLET, FILM COATED ORAL DAILY
Refills: 0 | Status: DISCONTINUED | OUTPATIENT
Start: 2024-06-19 | End: 2024-06-19

## 2024-06-18 RX ORDER — SODIUM CHLORIDE 0.9 % (FLUSH) 0.9 %
3 SYRINGE (ML) INJECTION EVERY 8 HOURS
Refills: 0 | Status: DISCONTINUED | OUTPATIENT
Start: 2024-06-18 | End: 2024-06-18

## 2024-06-18 RX ORDER — LIDOCAINE HYDROCHLORIDE 20 MG/ML
0.2 INJECTION, SOLUTION EPIDURAL; INFILTRATION; INTRACAUDAL; PERINEURAL ONCE
Refills: 0 | Status: DISCONTINUED | OUTPATIENT
Start: 2024-06-18 | End: 2024-06-18

## 2024-06-18 RX ORDER — ACETAMINOPHEN 325 MG
1000 TABLET ORAL EVERY 6 HOURS
Refills: 0 | Status: DISCONTINUED | OUTPATIENT
Start: 2024-06-18 | End: 2024-06-19

## 2024-06-18 RX ORDER — OXYCODONE HYDROCHLORIDE 100 MG/5ML
5 SOLUTION ORAL EVERY 4 HOURS
Refills: 0 | Status: DISCONTINUED | OUTPATIENT
Start: 2024-06-18 | End: 2024-06-19

## 2024-06-18 RX ORDER — ONDANSETRON HYDROCHLORIDE 2 MG/ML
4 INJECTION INTRAMUSCULAR; INTRAVENOUS ONCE
Refills: 0 | Status: DISCONTINUED | OUTPATIENT
Start: 2024-06-18 | End: 2024-06-18

## 2024-06-18 RX ORDER — AMLODIPINE BESYLATE 2.5 MG/1
5 TABLET ORAL DAILY
Refills: 0 | Status: DISCONTINUED | OUTPATIENT
Start: 2024-06-18 | End: 2024-06-19

## 2024-06-18 RX ORDER — CEFAZOLIN 10 G/1
2000 INJECTION, POWDER, FOR SOLUTION INTRAVENOUS ONCE
Refills: 0 | Status: COMPLETED | OUTPATIENT
Start: 2024-06-18 | End: 2024-06-18

## 2024-06-18 RX ORDER — OXYCODONE HYDROCHLORIDE 100 MG/5ML
10 SOLUTION ORAL EVERY 4 HOURS
Refills: 0 | Status: DISCONTINUED | OUTPATIENT
Start: 2024-06-18 | End: 2024-06-19

## 2024-06-18 RX ORDER — BRIMONIDINE TARTRATE 1.5 MG/ML
1 SOLUTION/ DROPS OPHTHALMIC
Refills: 0 | Status: DISCONTINUED | OUTPATIENT
Start: 2024-06-18 | End: 2024-06-19

## 2024-06-18 RX ORDER — SODIUM CHLORIDE 0.9 % (FLUSH) 0.9 %
1000 SYRINGE (ML) INJECTION
Refills: 0 | Status: DISCONTINUED | OUTPATIENT
Start: 2024-06-18 | End: 2024-06-19

## 2024-06-18 RX ADMIN — HYDROMORPHONE HCL 0.25 MILLIGRAM(S): 0.2 INJECTION, SOLUTION INTRAVENOUS at 10:50

## 2024-06-18 RX ADMIN — AMLODIPINE BESYLATE 5 MILLIGRAM(S): 2.5 TABLET ORAL at 18:16

## 2024-06-18 RX ADMIN — CEFAZOLIN 100 MILLIGRAM(S): 10 INJECTION, POWDER, FOR SOLUTION INTRAVENOUS at 18:16

## 2024-06-18 RX ADMIN — HYDROMORPHONE HCL 0.25 MILLIGRAM(S): 0.2 INJECTION, SOLUTION INTRAVENOUS at 11:35

## 2024-06-18 RX ADMIN — Medication 75 MILLILITER(S): at 18:17

## 2024-06-18 RX ADMIN — HYDROMORPHONE HCL 0.25 MILLIGRAM(S): 0.2 INJECTION, SOLUTION INTRAVENOUS at 11:50

## 2024-06-18 RX ADMIN — TIMOLOL MALEATE 1 DROP(S): 3.4 SOLUTION/ DROPS OPHTHALMIC at 21:24

## 2024-06-18 RX ADMIN — Medication 75 MILLILITER(S): at 11:36

## 2024-06-18 RX ADMIN — HYDROMORPHONE HCL 0.25 MILLIGRAM(S): 0.2 INJECTION, SOLUTION INTRAVENOUS at 11:05

## 2024-06-19 ENCOUNTER — TRANSCRIPTION ENCOUNTER (OUTPATIENT)
Age: 74
End: 2024-06-19

## 2024-06-19 VITALS
SYSTOLIC BLOOD PRESSURE: 133 MMHG | DIASTOLIC BLOOD PRESSURE: 67 MMHG | TEMPERATURE: 98 F | HEART RATE: 73 BPM | OXYGEN SATURATION: 97 % | RESPIRATION RATE: 17 BRPM

## 2024-06-19 LAB
ANION GAP SERPL CALC-SCNC: 15 MMOL/L — SIGNIFICANT CHANGE UP (ref 5–17)
ANISOCYTOSIS BLD QL: SLIGHT — SIGNIFICANT CHANGE UP
BASOPHILS # BLD AUTO: 0.17 K/UL — SIGNIFICANT CHANGE UP (ref 0–0.2)
BASOPHILS NFR BLD AUTO: 0.9 % — SIGNIFICANT CHANGE UP (ref 0–2)
BUN SERPL-MCNC: 16 MG/DL — SIGNIFICANT CHANGE UP (ref 7–23)
CALCIUM SERPL-MCNC: 10.3 MG/DL — SIGNIFICANT CHANGE UP (ref 8.4–10.5)
CHLORIDE SERPL-SCNC: 104 MMOL/L — SIGNIFICANT CHANGE UP (ref 96–108)
CO2 SERPL-SCNC: 21 MMOL/L — LOW (ref 22–31)
CREAT SERPL-MCNC: 1.13 MG/DL — SIGNIFICANT CHANGE UP (ref 0.5–1.3)
EGFR: 67 ML/MIN/1.73M2 — SIGNIFICANT CHANGE UP
EOSINOPHIL # BLD AUTO: 0 K/UL — SIGNIFICANT CHANGE UP (ref 0–0.5)
EOSINOPHIL NFR BLD AUTO: 0 % — SIGNIFICANT CHANGE UP (ref 0–6)
GLUCOSE SERPL-MCNC: 120 MG/DL — HIGH (ref 70–99)
HCT VFR BLD CALC: 38.5 % — LOW (ref 39–50)
HGB BLD-MCNC: 13 G/DL — SIGNIFICANT CHANGE UP (ref 13–17)
HYPOCHROMIA BLD QL: SLIGHT — SIGNIFICANT CHANGE UP
LYMPHOCYTES # BLD AUTO: 19.8 % — SIGNIFICANT CHANGE UP (ref 13–44)
LYMPHOCYTES # BLD AUTO: 3.8 K/UL — HIGH (ref 1–3.3)
MAGNESIUM SERPL-MCNC: 2 MG/DL — SIGNIFICANT CHANGE UP (ref 1.6–2.6)
MANUAL SMEAR VERIFICATION: SIGNIFICANT CHANGE UP
MCHC RBC-ENTMCNC: 26.9 PG — LOW (ref 27–34)
MCHC RBC-ENTMCNC: 33.8 GM/DL — SIGNIFICANT CHANGE UP (ref 32–36)
MCV RBC AUTO: 79.7 FL — LOW (ref 80–100)
MONOCYTES # BLD AUTO: 0.73 K/UL — SIGNIFICANT CHANGE UP (ref 0–0.9)
MONOCYTES NFR BLD AUTO: 3.8 % — SIGNIFICANT CHANGE UP (ref 2–14)
NEUTROPHILS # BLD AUTO: 14.11 K/UL — HIGH (ref 1.8–7.4)
NEUTROPHILS NFR BLD AUTO: 73.6 % — SIGNIFICANT CHANGE UP (ref 43–77)
OVALOCYTES BLD QL SMEAR: SLIGHT — SIGNIFICANT CHANGE UP
PHOSPHATE SERPL-MCNC: 1.6 MG/DL — LOW (ref 2.5–4.5)
PLAT MORPH BLD: NORMAL — SIGNIFICANT CHANGE UP
PLATELET # BLD AUTO: 311 K/UL — SIGNIFICANT CHANGE UP (ref 150–400)
POTASSIUM SERPL-MCNC: 4 MMOL/L — SIGNIFICANT CHANGE UP (ref 3.5–5.3)
POTASSIUM SERPL-SCNC: 4 MMOL/L — SIGNIFICANT CHANGE UP (ref 3.5–5.3)
RBC # BLD: 4.83 M/UL — SIGNIFICANT CHANGE UP (ref 4.2–5.8)
RBC # FLD: 15 % — HIGH (ref 10.3–14.5)
RBC BLD AUTO: ABNORMAL
SCHISTOCYTES BLD QL AUTO: SLIGHT — SIGNIFICANT CHANGE UP
SMUDGE CELLS # BLD: PRESENT — SIGNIFICANT CHANGE UP
SODIUM SERPL-SCNC: 140 MMOL/L — SIGNIFICANT CHANGE UP (ref 135–145)
VARIANT LYMPHS # BLD: 1.9 % — SIGNIFICANT CHANGE UP (ref 0–6)
WBC # BLD: 19.17 K/UL — HIGH (ref 3.8–10.5)
WBC # FLD AUTO: 19.17 K/UL — HIGH (ref 3.8–10.5)

## 2024-06-19 PROCEDURE — C1889: CPT

## 2024-06-19 PROCEDURE — 97165 OT EVAL LOW COMPLEX 30 MIN: CPT

## 2024-06-19 PROCEDURE — 97161 PT EVAL LOW COMPLEX 20 MIN: CPT

## 2024-06-19 PROCEDURE — 80048 BASIC METABOLIC PNL TOTAL CA: CPT

## 2024-06-19 PROCEDURE — 83735 ASSAY OF MAGNESIUM: CPT

## 2024-06-19 PROCEDURE — C9399: CPT

## 2024-06-19 PROCEDURE — 70450 CT HEAD/BRAIN W/O DYE: CPT | Mod: MC

## 2024-06-19 PROCEDURE — 84100 ASSAY OF PHOSPHORUS: CPT

## 2024-06-19 PROCEDURE — 93970 EXTREMITY STUDY: CPT

## 2024-06-19 PROCEDURE — 36415 COLL VENOUS BLD VENIPUNCTURE: CPT

## 2024-06-19 PROCEDURE — 85027 COMPLETE CBC AUTOMATED: CPT

## 2024-06-19 PROCEDURE — 85025 COMPLETE CBC W/AUTO DIFF WBC: CPT

## 2024-06-19 RX ORDER — TRAMADOL HYDROCHLORIDE 50 MG/1
25 TABLET, FILM COATED ORAL EVERY 6 HOURS
Refills: 0 | Status: DISCONTINUED | OUTPATIENT
Start: 2024-06-19 | End: 2024-06-19

## 2024-06-19 RX ORDER — AMLODIPINE BESYLATE 2.5 MG/1
5 TABLET ORAL ONCE
Refills: 0 | Status: DISCONTINUED | OUTPATIENT
Start: 2024-06-19 | End: 2024-06-19

## 2024-06-19 RX ORDER — AMLODIPINE BESYLATE 2.5 MG/1
5 TABLET ORAL DAILY
Refills: 0 | Status: DISCONTINUED | OUTPATIENT
Start: 2024-06-19 | End: 2024-06-19

## 2024-06-19 RX ORDER — SOD PHOS DI, MONO/K PHOS MONO 250 MG
2 TABLET ORAL ONCE
Refills: 0 | Status: COMPLETED | OUTPATIENT
Start: 2024-06-19 | End: 2024-06-19

## 2024-06-19 RX ORDER — HYDRALAZINE HYDROCHLORIDE 50 MG/1
5 TABLET ORAL ONCE
Refills: 0 | Status: COMPLETED | OUTPATIENT
Start: 2024-06-19 | End: 2024-06-19

## 2024-06-19 RX ORDER — ACETAMINOPHEN 325 MG
2 TABLET ORAL
Qty: 0 | Refills: 0 | DISCHARGE
Start: 2024-06-19

## 2024-06-19 RX ORDER — HYDRALAZINE HYDROCHLORIDE 50 MG/1
10 TABLET ORAL ONCE
Refills: 0 | Status: COMPLETED | OUTPATIENT
Start: 2024-06-19 | End: 2024-06-19

## 2024-06-19 RX ORDER — ENOXAPARIN SODIUM 100 MG/ML
40 INJECTION SUBCUTANEOUS
Refills: 0 | Status: DISCONTINUED | OUTPATIENT
Start: 2024-06-19 | End: 2024-06-19

## 2024-06-19 RX ORDER — POLYETHYLENE GLYCOL 3350 1 G/G
17 POWDER ORAL
Qty: 0 | Refills: 0 | DISCHARGE
Start: 2024-06-19

## 2024-06-19 RX ORDER — POLYETHYLENE GLYCOL 3350 1 G/G
17 POWDER ORAL EVERY 12 HOURS
Refills: 0 | Status: DISCONTINUED | OUTPATIENT
Start: 2024-06-19 | End: 2024-06-19

## 2024-06-19 RX ORDER — SENNOSIDES 8.6 MG
2 TABLET ORAL
Qty: 0 | Refills: 0 | DISCHARGE
Start: 2024-06-19

## 2024-06-19 RX ORDER — ACETAMINOPHEN 325 MG
650 TABLET ORAL EVERY 6 HOURS
Refills: 0 | Status: DISCONTINUED | OUTPATIENT
Start: 2024-06-19 | End: 2024-06-19

## 2024-06-19 RX ORDER — TRAMADOL HYDROCHLORIDE 50 MG/1
0.5 TABLET, FILM COATED ORAL
Qty: 6 | Refills: 0
Start: 2024-06-19 | End: 2024-06-21

## 2024-06-19 RX ORDER — AMLODIPINE BESYLATE 2.5 MG/1
10 TABLET ORAL DAILY
Refills: 0 | Status: DISCONTINUED | OUTPATIENT
Start: 2024-06-19 | End: 2024-06-19

## 2024-06-19 RX ADMIN — AMLODIPINE BESYLATE 5 MILLIGRAM(S): 2.5 TABLET ORAL at 05:03

## 2024-06-19 RX ADMIN — Medication 400 MILLIGRAM(S): at 05:04

## 2024-06-19 RX ADMIN — AMLODIPINE BESYLATE 5 MILLIGRAM(S): 2.5 TABLET ORAL at 11:45

## 2024-06-19 RX ADMIN — HYDRALAZINE HYDROCHLORIDE 10 MILLIGRAM(S): 50 TABLET ORAL at 01:53

## 2024-06-19 RX ADMIN — ASPIRIN 81 MILLIGRAM(S): 325 TABLET, FILM COATED ORAL at 11:45

## 2024-06-19 RX ADMIN — CEFAZOLIN 100 MILLIGRAM(S): 10 INJECTION, POWDER, FOR SOLUTION INTRAVENOUS at 00:23

## 2024-06-19 RX ADMIN — Medication 2 PACKET(S): at 09:31

## 2024-06-19 RX ADMIN — Medication 75 MILLILITER(S): at 05:06

## 2024-06-19 RX ADMIN — ONDANSETRON HYDROCHLORIDE 4 MILLIGRAM(S): 2 INJECTION INTRAMUSCULAR; INTRAVENOUS at 05:03

## 2024-06-19 RX ADMIN — HYDRALAZINE HYDROCHLORIDE 5 MILLIGRAM(S): 50 TABLET ORAL at 00:21

## 2024-06-19 RX ADMIN — Medication 63.75 MILLIMOLE(S): at 09:31

## 2024-06-28 ENCOUNTER — NON-APPOINTMENT (OUTPATIENT)
Age: 74
End: 2024-06-28

## 2024-07-11 ENCOUNTER — NON-APPOINTMENT (OUTPATIENT)
Age: 74
End: 2024-07-11

## 2024-07-11 ENCOUNTER — APPOINTMENT (OUTPATIENT)
Dept: SPINE | Facility: CLINIC | Age: 74
End: 2024-07-11
Payer: MEDICARE

## 2024-07-11 VITALS
DIASTOLIC BLOOD PRESSURE: 73 MMHG | SYSTOLIC BLOOD PRESSURE: 130 MMHG | WEIGHT: 195 LBS | BODY MASS INDEX: 29.55 KG/M2 | OXYGEN SATURATION: 93 % | HEART RATE: 93 BPM | HEIGHT: 68 IN

## 2024-07-11 PROCEDURE — 99024 POSTOP FOLLOW-UP VISIT: CPT

## 2024-07-23 ENCOUNTER — NON-APPOINTMENT (OUTPATIENT)
Age: 74
End: 2024-07-23

## 2024-07-23 ENCOUNTER — APPOINTMENT (OUTPATIENT)
Dept: NEUROSURGERY | Facility: CLINIC | Age: 74
End: 2024-07-23
Payer: MEDICARE

## 2024-07-23 VITALS
OXYGEN SATURATION: 97 % | DIASTOLIC BLOOD PRESSURE: 77 MMHG | WEIGHT: 195 LBS | SYSTOLIC BLOOD PRESSURE: 158 MMHG | HEIGHT: 68 IN | BODY MASS INDEX: 29.55 KG/M2 | HEART RATE: 64 BPM

## 2024-07-23 DIAGNOSIS — G93.89 OTHER SPECIFIED DISORDERS OF BRAIN: ICD-10-CM

## 2024-07-23 DIAGNOSIS — Z09 ENCOUNTER FOR FOLLOW-UP EXAMINATION AFTER COMPLETED TREATMENT FOR CONDITIONS OTHER THAN MALIGNANT NEOPLASM: ICD-10-CM

## 2024-07-23 PROCEDURE — 62252 CSF SHUNT REPROGRAM: CPT

## 2024-07-23 PROCEDURE — 99024 POSTOP FOLLOW-UP VISIT: CPT

## 2024-07-23 NOTE — PHYSICAL EXAM
[FreeTextEntry1] : Certas valve setting at 6  [General Appearance - Alert] : alert [General Appearance - In No Acute Distress] : in no acute distress [Clean] : clean [Dry] : dry [Intact] : intact [Oriented To Time, Place, And Person] : oriented to person, place, and time [Person] : oriented to person [Place] : oriented to place [Time] : oriented to time [Short Term Intact] : short term memory intact [Remote Intact] : remote memory intact [Span Intact] : the attention span was normal [Concentration Intact] : normal concentrating ability [Fluency] : fluency intact [Comprehension] : comprehension intact [Current Events] : adequate knowledge of current events [Past History] : adequate knowledge of personal past history [Vocabulary] : adequate range of vocabulary [Cranial Nerves Optic (II)] : visual acuity intact bilaterally,  pupils equal round and reactive to light [Cranial Nerves Oculomotor (III)] : extraocular motion intact [Cranial Nerves Trigeminal (V)] : facial sensation intact symmetrically [Cranial Nerves Facial (VII)] : face symmetrical [Cranial Nerves Vestibulocochlear (VIII)] : hearing was intact bilaterally [Cranial Nerves Glossopharyngeal (IX)] : tongue and palate midline [Cranial Nerves Accessory (XI - Cranial And Spinal)] : head turning and shoulder shrug symmetric [Cranial Nerves Hypoglossal (XII)] : there was no tongue deviation with protrusion [Motor Tone] : muscle tone was normal in all four extremities [Motor Strength] : muscle strength was normal in all four extremities [No Muscle Atrophy] : normal bulk in all four extremities [Sensation Tactile Decrease] : light touch was intact [FreeTextEntry8] : slightly shuffling gait, ambulating with a cane [Sclera] : the sclera and conjunctiva were normal [PERRL With Normal Accommodation] : pupils were equal in size, round, reactive to light, with normal accommodation [Extraocular Movements] : extraocular movements were intact [Outer Ear] : the ears and nose were normal in appearance [Neck Appearance] : the appearance of the neck was normal [] : no respiratory distress [Abnormal Walk] : normal gait

## 2024-07-23 NOTE — REASON FOR VISIT
[de-identified] : Creation of a ventriculoperitoneal shunt with a Certas programmable valve (set at 6) [de-identified] : 6/18/24

## 2024-07-23 NOTE — HISTORY OF PRESENT ILLNESS
[FreeTextEntry1] : 7/23/24: Mr. Tatum went for routine CTH noncontrast today at [facility]. The radiologist called the office urgently to let us know that he has developed a right-sided subdural collection overlying the right frontal lobe measuring 9.8 mm, with little to no midline shift (see photo). Patient was informed by Shakira Frederick NP to come straight to our office for shunt reprogramming.   Today he reports he is asymptomatic and his walking has been continuing to improve. He has had no episodes of urinary incontinence since VPS placement. He denies weakness, headaches, nausea/vomiting, or mental status change.   7/11/24: Mr. Tatum is a very pleasant 74- year -old gentleman who has a PMH of gout, renal insufficiency, a lung nodule, HTN, and prostate cancer who underwent a 3 day lumbar drain trial and was found to have improvement in his balance and overall cognition. He underwent creation of a ventriculoperitoneal shunt with a programmable Certas Codman valve with an anti-siphon device set at 6.0 on 06/18/2024. The patient states that he is doing well and was without headache or dizziness but did have occasional nausea. He feels that his gait may have improved a little, but his memory and urinary incontinence have improved more. He is no longer incontinent. He reports occasional nausea without abdominal pain. The patient is here for removal of staples.  6/18/24: Creation of a ventriculoperitoneal shunt with a Certas programmable valve (set at 6)  4/22/24: This is a very pleasant 73-year-old male who presents today accompanied by his wife. He had a lumbar drain trial on 3/26 due to shuffling gait, instability, extremity weakness. Patient states that after the drain was remove, he did well for a week, however, the pre drain (NPH) symptoms have returned. He presents to discuss what the next step is as far as having a shunt placed.  3/26/24: Lumbar drain trial x 3 days.  Initial HPI 3/11/24: Mr. Tatum is a very pleasant 73 year old gentleman, who has been having worsening gait and balance trouble for the last one to two years. He states that it is a feeling of unsteadiness when he is walking. He also friend have some short-term memory difficulty that began about a year ago. He did have a workup about a year ago by a different neurologist from Dr. Torrez, with a lumbar puncture and the patient states that he did not have any significant improvement. We do not have any of the documentation of this. He is her now for a neurosurgical opinion regarding possible NPH.

## 2024-07-23 NOTE — DATA REVIEWED
[de-identified] : CTH noncontrast on 7/23/24 at [facility]   Patient: CHUCK JACOME YOB: 1950 Phone: [phone] MRN: [id number] Acc: [id number] Date of Exam: 07-   EXAM: CT HEAD WITHOUT CONTRAST  Note - This patient has received 0 CT studies and 0 Myocardial Perfusion studies within our network over the previous 12 month period.  HISTORY: Hydrocephalus with recent shunt treatment.   TECHNIQUE:  Axial images were obtained through the head without contrast. Sagittal and coronal reconstructions were performed. One or more of the following dose reduction techniques were used: automated exposure control, adjustment of the mA and/or kV according to patient size, use of iterative reconstruction technique.   COMPARISON:  MRI brain 2/7/2024.   FINDINGS: There has been interval placement of a right lateral ventricular shunt catheter terminating in the midline. The ventricles are minimally decreased in size when compared to the prior study. There is a new right frontal subdural collection measuring 9.8 mm in transverse dimension. Most of the collection is low density. There is a small rim of high density which could represent recent blood products or a membrane. No midline shift observed. There is some encephalomalacia surrounding the shunt tract.   IMPRESSION: Interval placement of a right lateral ventricular shunt catheter. Minimal decrease in the size of the ventricles since prior study.  Interval development of a predominantly hypodense right subdural collection overlying the right frontal lobe measuring 9.8 mm transverse dimension. No right-to-left shift observed.   Case reviewed with Shakira nurse practitioner on 7/23/24 at 8:55 am.  Thank you for the opportunity to participate in the care of this patient.     Sofia Barros MD  - Electronically Signed: 07- 8:57 AM  Physician to Physician Direct Line is: [phone]

## 2024-07-23 NOTE — ASSESSMENT
[FreeTextEntry1] : 74 year old male 5 weeks s/p placement of  shunt on 6/18/24, doing well, urinary incontinence and walking improved. CTH noncontrast performed this morning shows right-sided subdural collection overlying the right frontal lobe measuring 9.8 mm, with little to no midline shift. On exam he is AAOx3, full strength in all extremities, no drift, CNs II-XII grossly intact, walking with slightly shuffling gait and using a cane. Shunt valve checked and is set at 6, it was reprogrammed to 8. Confirmed with digital Certas  that valve is now set at 8. Incision healing well.  - CTH ordered to be completed Monday 7/29/24, our office staff helped make him an appointment. - Patient will follow up in office for appointment with Dr. Mills on Monday 7/29/24 to review CTH. - Patient advised to monitor his symptoms until next appointment and if he develops weakness (namely left sided weakness) or any new neurological symptoms, persistent headaches with associated nausea or vomiting, mental status change to call office and go to the ER. - Patient instructed to bring CD with images of CTH from 7/23/24 and 7/29/24 to his appointment on 7/29/24. - All questions and concerns addressed at his appointment. Patient verbalizes understanding and is in agreement with plan.

## 2024-07-23 NOTE — REASON FOR VISIT
[de-identified] : Creation of a ventriculoperitoneal shunt with a Certas programmable valve (set at 6) [de-identified] : 6/18/24

## 2024-07-23 NOTE — DATA REVIEWED
[de-identified] : CTH noncontrast on 7/23/24 at [facility]   Patient: CHUCK JACOME YOB: 1950 Phone: [phone] MRN: [id number] Acc: [id number] Date of Exam: 07-   EXAM: CT HEAD WITHOUT CONTRAST  Note - This patient has received 0 CT studies and 0 Myocardial Perfusion studies within our network over the previous 12 month period.  HISTORY: Hydrocephalus with recent shunt treatment.   TECHNIQUE:  Axial images were obtained through the head without contrast. Sagittal and coronal reconstructions were performed. One or more of the following dose reduction techniques were used: automated exposure control, adjustment of the mA and/or kV according to patient size, use of iterative reconstruction technique.   COMPARISON:  MRI brain 2/7/2024.   FINDINGS: There has been interval placement of a right lateral ventricular shunt catheter terminating in the midline. The ventricles are minimally decreased in size when compared to the prior study. There is a new right frontal subdural collection measuring 9.8 mm in transverse dimension. Most of the collection is low density. There is a small rim of high density which could represent recent blood products or a membrane. No midline shift observed. There is some encephalomalacia surrounding the shunt tract.   IMPRESSION: Interval placement of a right lateral ventricular shunt catheter. Minimal decrease in the size of the ventricles since prior study.  Interval development of a predominantly hypodense right subdural collection overlying the right frontal lobe measuring 9.8 mm transverse dimension. No right-to-left shift observed.   Case reviewed with Shakira nurse practitioner on 7/23/24 at 8:55 am.  Thank you for the opportunity to participate in the care of this patient.     Sofia Barros MD  - Electronically Signed: 07- 8:57 AM  Physician to Physician Direct Line is: [phone]

## 2024-07-23 NOTE — HISTORY OF PRESENT ILLNESS
[FreeTextEntry1] : 7/23/24: Mr. Tatum went for routine CTH noncontrast today at [facility]. The radiologist called the office urgently to let us know that he has developed a right-sided subdural collection overlying the right frontal lobe measuring 9.8 mm, with little to no midline shift (see photo). Patient was informed by Shakira Frederick NP to come straight to our office for shunt reprogramming.   Today he reports he is asymptomatic and his walking has been continuing to improve. He has had no episodes of urinary incontinence since VPS placement. He denies weakness, headaches, nausea/vomiting, or mental status change.   7/11/24: Mr. Tatum is a very pleasant 74- year -old gentleman who has a PMH of gout, renal insufficiency, a lung nodule, HTN, and prostate cancer who underwent a 3 day lumbar drain trial and was found to have improvement in his balance and overall cognition. He underwent creation of a ventriculoperitoneal shunt with a programmable Certas Codman valve with an anti-siphon device set at 6.0 on 06/18/2024. The patient states that he is doing well and was without headache or dizziness but did have occasional nausea. He feels that his gait may have improved a little, but his memory and urinary incontinence have improved more. He is no longer incontinent. He reports occasional nausea without abdominal pain. The patient is here for removal of staples.  6/18/24: Creation of a ventriculoperitoneal shunt with a Certas programmable valve (set at 6)  4/22/24: This is a very pleasant 73-year-old male who presents today accompanied by his wife. He had a lumbar drain trial on 3/26 due to shuffling gait, instability, extremity weakness. Patient states that after the drain was remove, he did well for a week, however, the pre drain (NPH) symptoms have returned. He presents to discuss what the next step is as far as having a shunt placed.  3/26/24: Lumbar drain trial x 3 days.  Initial HPI 3/11/24: Mr. Tatum is a very pleasant 73 year old gentleman, who has been having worsening gait and balance trouble for the last one to two years. He states that it is a feeling of unsteadiness when he is walking. He also friend have some short-term memory difficulty that began about a year ago. He did have a workup about a year ago by a different neurologist from Dr. Torrez, with a lumbar puncture and the patient states that he did not have any significant improvement. We do not have any of the documentation of this. He is her now for a neurosurgical opinion regarding possible NPH.

## 2024-07-25 ENCOUNTER — NON-APPOINTMENT (OUTPATIENT)
Age: 74
End: 2024-07-25

## 2024-07-25 ENCOUNTER — APPOINTMENT (OUTPATIENT)
Dept: SPINE | Facility: CLINIC | Age: 74
End: 2024-07-25

## 2024-07-28 ENCOUNTER — NON-APPOINTMENT (OUTPATIENT)
Age: 74
End: 2024-07-28

## 2024-07-29 ENCOUNTER — APPOINTMENT (OUTPATIENT)
Dept: SPINE | Facility: CLINIC | Age: 74
End: 2024-07-29
Payer: MEDICARE

## 2024-07-29 VITALS
WEIGHT: 195 LBS | HEIGHT: 68 IN | OXYGEN SATURATION: 92 % | HEART RATE: 65 BPM | SYSTOLIC BLOOD PRESSURE: 138 MMHG | BODY MASS INDEX: 29.55 KG/M2 | DIASTOLIC BLOOD PRESSURE: 74 MMHG

## 2024-07-29 DIAGNOSIS — G91.2 (IDIOPATHIC) NORMAL PRESSURE HYDROCEPHALUS: ICD-10-CM

## 2024-07-29 PROCEDURE — 99024 POSTOP FOLLOW-UP VISIT: CPT

## 2024-08-12 ENCOUNTER — APPOINTMENT (OUTPATIENT)
Dept: SPINE | Facility: CLINIC | Age: 74
End: 2024-08-12
Payer: MEDICARE

## 2024-08-12 VITALS — HEART RATE: 70 BPM | OXYGEN SATURATION: 98 % | SYSTOLIC BLOOD PRESSURE: 148 MMHG | DIASTOLIC BLOOD PRESSURE: 77 MMHG

## 2024-08-12 VITALS — WEIGHT: 195 LBS | BODY MASS INDEX: 29.55 KG/M2 | HEIGHT: 68 IN

## 2024-08-12 DIAGNOSIS — G91.2 (IDIOPATHIC) NORMAL PRESSURE HYDROCEPHALUS: ICD-10-CM

## 2024-08-12 PROCEDURE — 95972 ALYS CPLX SP/PN NPGT W/PRGRM: CPT

## 2024-09-05 ENCOUNTER — APPOINTMENT (OUTPATIENT)
Dept: ENDOCRINOLOGY | Facility: CLINIC | Age: 74
End: 2024-09-05
Payer: MEDICARE

## 2024-09-05 VITALS
SYSTOLIC BLOOD PRESSURE: 138 MMHG | WEIGHT: 197 LBS | HEART RATE: 71 BPM | BODY MASS INDEX: 29.86 KG/M2 | HEIGHT: 68 IN | OXYGEN SATURATION: 97 % | DIASTOLIC BLOOD PRESSURE: 82 MMHG

## 2024-09-05 DIAGNOSIS — E83.52 HYPERCALCEMIA: ICD-10-CM

## 2024-09-05 PROCEDURE — 99204 OFFICE O/P NEW MOD 45 MIN: CPT

## 2024-09-05 NOTE — HISTORY OF PRESENT ILLNESS
[FreeTextEntry1] : 74 year  M here for assessment of HYPERCALCEMIA  Patient with past medical hx as below, remarkable for prostate ca on lupron  Associated symptoms:  Fatigue: No Polyuria and polydipsia: No Constipation: No Abdominal pain:  No Vomiting:  No Bone pain: No Renal stones:  No Confusion: No Depression/memory impairment:  No   Known hx of malignancy:  prostate ca Known hx of granulomatous diseases: No Associated medications (including thiazide-type diuretics and vitamin D): No Hyperthyroidism:  No Hx of fractures as an adult: No

## 2024-09-06 LAB
24R-OH-CALCIDIOL SERPL-MCNC: 35 PG/ML
25(OH)D3 SERPL-MCNC: 28.3 NG/ML
ALBUMIN SERPL ELPH-MCNC: 4.6 G/DL
CALCIUM SERPL-MCNC: 10.4 MG/DL
CALCIUM SERPL-MCNC: 10.4 MG/DL
CREAT SERPL-MCNC: 1.22 MG/DL
EGFR: 61 ML/MIN/1.73M2
PARATHYROID HORMONE INTACT: 76 PG/ML
T4 FREE SERPL-MCNC: 1.5 NG/DL
TSH SERPL-ACNC: 1.61 UIU/ML

## 2024-09-09 ENCOUNTER — APPOINTMENT (OUTPATIENT)
Dept: SPINE | Facility: CLINIC | Age: 74
End: 2024-09-09
Payer: MEDICARE

## 2024-09-09 VITALS
HEART RATE: 71 BPM | SYSTOLIC BLOOD PRESSURE: 130 MMHG | HEIGHT: 68 IN | BODY MASS INDEX: 29.86 KG/M2 | DIASTOLIC BLOOD PRESSURE: 86 MMHG | OXYGEN SATURATION: 97 % | WEIGHT: 197 LBS

## 2024-09-09 DIAGNOSIS — G93.89 OTHER SPECIFIED DISORDERS OF BRAIN: ICD-10-CM

## 2024-09-09 DIAGNOSIS — G91.2 (IDIOPATHIC) NORMAL PRESSURE HYDROCEPHALUS: ICD-10-CM

## 2024-09-09 LAB — CA-I SERPL-SCNC: 5.7 MG/DL

## 2024-09-09 PROCEDURE — 99024 POSTOP FOLLOW-UP VISIT: CPT

## 2024-09-15 LAB — PTH RELATED PROT SERPL-MCNC: <2 PMOL/L

## 2024-09-26 ENCOUNTER — NON-APPOINTMENT (OUTPATIENT)
Age: 74
End: 2024-09-26

## 2024-10-14 ENCOUNTER — APPOINTMENT (OUTPATIENT)
Dept: SPINE | Facility: CLINIC | Age: 74
End: 2024-10-14
Payer: MEDICARE

## 2024-10-14 VITALS
OXYGEN SATURATION: 98 % | DIASTOLIC BLOOD PRESSURE: 84 MMHG | HEIGHT: 68 IN | HEART RATE: 66 BPM | BODY MASS INDEX: 29.86 KG/M2 | SYSTOLIC BLOOD PRESSURE: 138 MMHG | WEIGHT: 197 LBS

## 2024-10-14 DIAGNOSIS — G91.2 (IDIOPATHIC) NORMAL PRESSURE HYDROCEPHALUS: ICD-10-CM

## 2024-10-14 PROCEDURE — 99213 OFFICE O/P EST LOW 20 MIN: CPT

## 2024-10-14 RX ORDER — ASPIRIN 81 MG
81 TABLET, DELAYED RELEASE (ENTERIC COATED) ORAL
Refills: 0 | Status: ACTIVE | COMMUNITY

## 2024-11-25 ENCOUNTER — APPOINTMENT (OUTPATIENT)
Dept: SPINE | Facility: CLINIC | Age: 74
End: 2024-11-25
Payer: MEDICARE

## 2024-11-25 VITALS
WEIGHT: 197 LBS | OXYGEN SATURATION: 96 % | HEART RATE: 87 BPM | SYSTOLIC BLOOD PRESSURE: 127 MMHG | HEIGHT: 68 IN | DIASTOLIC BLOOD PRESSURE: 85 MMHG | BODY MASS INDEX: 29.86 KG/M2

## 2024-11-25 DIAGNOSIS — G91.2 (IDIOPATHIC) NORMAL PRESSURE HYDROCEPHALUS: ICD-10-CM

## 2024-11-25 PROCEDURE — 99212 OFFICE O/P EST SF 10 MIN: CPT

## 2024-12-23 ENCOUNTER — APPOINTMENT (OUTPATIENT)
Dept: SPINE | Facility: CLINIC | Age: 74
End: 2024-12-23
Payer: MEDICARE

## 2024-12-23 VITALS
DIASTOLIC BLOOD PRESSURE: 94 MMHG | BODY MASS INDEX: 29.86 KG/M2 | HEART RATE: 62 BPM | SYSTOLIC BLOOD PRESSURE: 157 MMHG | HEIGHT: 68 IN | OXYGEN SATURATION: 96 % | WEIGHT: 197 LBS

## 2024-12-23 DIAGNOSIS — G91.2 (IDIOPATHIC) NORMAL PRESSURE HYDROCEPHALUS: ICD-10-CM

## 2024-12-23 PROCEDURE — 99213 OFFICE O/P EST LOW 20 MIN: CPT

## 2025-03-24 NOTE — ASSESSMENT
[FreeTextEntry1] : Mr Tatum is a 68 year old man presented for follow up of prostate cancer and microscopic hematuria. The patient had a radioactive seed implant of the prostate in 2006. His PSA subsequently keenan and needle biopsy showed local persistence and recurrence of prostate cancer. I performed cryosurgical ablation of the prostate on February 3, 2012. PSA from 12/07/16 was 15.81 ng/mL. CT urogram 12/19/16 found no evidence of metastatic disease. Nuclear medicine whole body bone scan the same date found no evidence of osseous metastasis. Focal increased uptake in the right ankle at the region of the right lateral malleolus most likely post traumatic. Degenerative disease in the spine and major joints. X-rays were normal. The patient reported that he is currently struggling with some right knee pain. The patient returned and reported that his urination is good. However, when he was in [place] it seems like he was urinating more frequently due to stress. Throughout the day the patient urinates 4-5 times daily. At night, he urinates 2-3 times. The patient denied any gross hematuria, dysuria, or stress incontinence. The patient reported he is still doing the Trimix injections.  I renewed the patients prescription for Trimix because he ran out, however I still advised him to schedule a follow up with Dr. Wilkins.  \par 4/17/18: The patient returned and reported his urination has improved. Patient denied dysuria, gross hematuria, urgency, or incontinence. Noted slight urgency when he holds his bladder. PSA from 11/20 was 11.00 ng/mL. \par 9/17/18: The patient returned and reported his urination is adequate. Complained of increased urinary urgency. Denied any pain. PSA from 4/17/18 was 18.79 ng/mL. The patient will repeat CT scan and bone scan. \par \par 5/20/19: The patient returned today and reported that his urination is adequate. Occasionally has urinary urgency. Doesn't need medical therapy for his urination. Has erectile dysfunction. Notes that he started using Trimix injections again.\par CT chest abdomen and pelvis from 10/1/18 findings showed no hydronephrosis, renal or collecting system calculi; no filling defects in the collecting systems or urinary bladder on urographic phase images; 2 mm right middle lobe pulmonary nodule; right axillary lymph node is slightly increase in size compared with the prior study. NM bone img whole body from 10/1/18 findings showed no scan evidence of osseous metastasis; degenerative disease in the spine and major joints; no significant change compared to the previous bone scan dated 12/19/16.Mr Tatum is a 68 year old man presented for follow up of prostate cancer and microscopic hematuria. The patient had a radioactive seed implant of the prostate in 2006. His PSA subsequently keenan and needle biopsy showed local persistence and recurrence of prostate cancer. I performed cryosurgical ablation of the prostate on February 3, 2012. PSA from 12/07/16 was 15.81 ng/mL. CT urogram 12/19/16 found no evidence of metastatic disease. Nuclear medicine whole body bone scan the same date found no evidence of osseous metastasis. Focal increased uptake in the right ankle at the region of the right lateral malleolus most likely post traumatic. Degenerative disease in the spine and major joints. X-rays were normal. The patient reported that he is currently struggling with some right knee pain. The patient returned and reported that his urination is good. However, when he was in [place] it seems like he was urinating more frequently due to stress. Throughout the day the patient urinates 4-5 times daily. At night, he urinates 2-3 times. The patient denied any gross hematuria, dysuria, or stress incontinence. The patient reported he is still doing the Trimix injections.  I renewed the patients prescription for Trimix because he ran out, however I still advised him to schedule a follow up with Dr. Wilkins.  \par 4/17/18: The patient returned and reported his urination has improved. Patient denied dysuria, gross hematuria, urgency, or incontinence. Noted slight urgency when he holds his bladder. PSA from 11/20 was 11.00 ng/mL. \par 9/17/18: The patient returned and reported his urination is adequate. Complained of increased urinary urgency. Denied any pain. PSA from 4/17/18 was 18.79 ng/mL. The patient will repeat CT scan and bone scan. \par \par 5/20/19: The patient returned today and reported that his urination is adequate. Occasionally has urinary urgency. Doesn't need medical therapy for his urination. Has erectile dysfunction. Notes that he started using Trimix injections again.\par CT chest abdomen and pelvis from 10/1/18 findings showed no hydronephrosis, renal or collecting system calculi; no filling defects in the collecting systems or urinary bladder on urographic phase images; 2 mm right middle lobe pulmonary nodule; right axillary lymph node is slightly increase in size compared with the prior study. NM bone img whole body from 10/1/18 findings showed no scan evidence of osseous metastasis; degenerative disease in the spine and major joints; no significant change compared to the previous bone scan dated 12/19/16.\par \par The patient produced a urine sample which will be sent for urinalysis, urine cytology, and urine culture.\par Blood work today included comprehensive metabolic panel, CBC, PSA profile, and testosterone.\par \par The patient will return in four months for a follow up.  Patient

## 2025-07-07 ENCOUNTER — APPOINTMENT (OUTPATIENT)
Dept: SPINE | Facility: CLINIC | Age: 75
End: 2025-07-07
Payer: MEDICARE

## 2025-07-07 ENCOUNTER — NON-APPOINTMENT (OUTPATIENT)
Age: 75
End: 2025-07-07

## 2025-07-07 VITALS
HEART RATE: 69 BPM | WEIGHT: 197 LBS | HEIGHT: 68 IN | SYSTOLIC BLOOD PRESSURE: 143 MMHG | DIASTOLIC BLOOD PRESSURE: 85 MMHG | BODY MASS INDEX: 29.86 KG/M2 | OXYGEN SATURATION: 94 %

## 2025-07-07 PROCEDURE — 62252 CSF SHUNT REPROGRAM: CPT

## 2025-07-07 PROCEDURE — 99213 OFFICE O/P EST LOW 20 MIN: CPT

## 2025-07-25 ENCOUNTER — OUTPATIENT (OUTPATIENT)
Dept: OUTPATIENT SERVICES | Facility: HOSPITAL | Age: 75
LOS: 1 days | End: 2025-07-25
Payer: COMMERCIAL

## 2025-07-25 ENCOUNTER — APPOINTMENT (OUTPATIENT)
Dept: CT IMAGING | Facility: IMAGING CENTER | Age: 75
End: 2025-07-25
Payer: MEDICARE

## 2025-07-25 DIAGNOSIS — Z00.8 ENCOUNTER FOR OTHER GENERAL EXAMINATION: ICD-10-CM

## 2025-07-25 DIAGNOSIS — Z98.49 CATARACT EXTRACTION STATUS, UNSPECIFIED EYE: Chronic | ICD-10-CM

## 2025-07-25 DIAGNOSIS — Z98.890 OTHER SPECIFIED POSTPROCEDURAL STATES: Chronic | ICD-10-CM

## 2025-07-25 DIAGNOSIS — G91.2 (IDIOPATHIC) NORMAL PRESSURE HYDROCEPHALUS: ICD-10-CM

## 2025-07-25 PROCEDURE — 70450 CT HEAD/BRAIN W/O DYE: CPT | Mod: 26

## 2025-07-25 PROCEDURE — 70450 CT HEAD/BRAIN W/O DYE: CPT

## (undated) DEVICE — DRSG MASTISOL

## (undated) DEVICE — TROCAR COVIDIEN VERSASTEP 5MM STANDARD

## (undated) DEVICE — ELCTR GROUNDING PAD ADULT COVIDIEN

## (undated) DEVICE — INTRO SHEATH INTEGRA PD 61CM

## (undated) DEVICE — MEDTRONIC AXIEM PATIENT TRACKER NON-INVASIVE

## (undated) DEVICE — DRAPE LIGHT HANDLE COVER (BLUE)

## (undated) DEVICE — DRSG TELFA 3 X 8

## (undated) DEVICE — SOL IRR POUR H2O 250ML

## (undated) DEVICE — SUT SOFSILK 2-0 18" TIES

## (undated) DEVICE — MEDTRONIC TRACKER BUNDLE NI

## (undated) DEVICE — GLV 7.5 PROTEXIS (WHITE)

## (undated) DEVICE — DRSG CURITY GAUZE SPONGE 4 X 4" 12-PLY

## (undated) DEVICE — MEDICATION LABELS W MARKER

## (undated) DEVICE — DRAPE 1/2 SHEET 40X57"

## (undated) DEVICE — MIDAS REX MR7 LUBRICANT DIFFUSER CARTRIDGE

## (undated) DEVICE — DRAPE CAMERA VIDEO 7"X96"

## (undated) DEVICE — CODMAN PERFORATOR 14MM (BLUE)

## (undated) DEVICE — DRSG STERISTRIPS 0.5 X 4"

## (undated) DEVICE — POSITIONER FOAM EGG CRATE ULNAR 2PCS (PINK)

## (undated) DEVICE — ELCTR BOVIE TIP BLADE INSULATED 2.75" EDGE

## (undated) DEVICE — SOLIDIFIER ISOLYZER 2000 CC

## (undated) DEVICE — SUT CHROMIC 3-0 30" V-20

## (undated) DEVICE — MEDTRONIC AXIEM TRACER POINTER

## (undated) DEVICE — PREP CHLORAPREP HI-LITE ORANGE 26ML

## (undated) DEVICE — SUT VICRYL 2-0 18" CP-2 UNDYED (POP-OFF)

## (undated) DEVICE — DRSG XEROFORM 1 X 8"

## (undated) DEVICE — STYLET INTRACRANIAL CATH EM

## (undated) DEVICE — WARMING BLANKET LOWER ADULT

## (undated) DEVICE — VENODYNE/SCD SLEEVE CALF MEDIUM

## (undated) DEVICE — MARKING PEN W RULER

## (undated) DEVICE — DRAPE TOWEL BLUE 17" X 24"

## (undated) DEVICE — SOL IRR POUR NS 0.9% 500ML

## (undated) DEVICE — Device

## (undated) DEVICE — TROCAR CODMAN SPLIT DISP

## (undated) DEVICE — ELCTR BIPOLAR CORD IRRIGATION AESCULAP DISP

## (undated) DEVICE — STAPLER SKIN VISI-STAT 35 WIDE

## (undated) DEVICE — GOWN TRIMAX LG

## (undated) DEVICE — MEDTRONIC AXIEM STYLET 23CM

## (undated) DEVICE — DRAPE MAYO STAND 30"

## (undated) DEVICE — SUT BOOT STANDARD (ASSORTED) 5 PAIR

## (undated) DEVICE — SUT VICRYL 3-0 18" X-1 (POP-OFF)

## (undated) DEVICE — GLV 8 PROTEXIS (WHITE)

## (undated) DEVICE — PACK VP SHUNT

## (undated) DEVICE — DRAPE 3/4 SHEET W REINFORCEMENT 56X77"

## (undated) DEVICE — DRSG TAPE HYPAFIX 4"

## (undated) DEVICE — SPECIMEN CONTAINER 100ML

## (undated) DEVICE — SUT BIOSYN 4-0 18" P-12

## (undated) DEVICE — PREP DURAPREP 26CC